# Patient Record
Sex: FEMALE | Race: WHITE | NOT HISPANIC OR LATINO | URBAN - METROPOLITAN AREA
[De-identification: names, ages, dates, MRNs, and addresses within clinical notes are randomized per-mention and may not be internally consistent; named-entity substitution may affect disease eponyms.]

---

## 2017-09-10 ENCOUNTER — EMERGENCY (EMERGENCY)
Facility: HOSPITAL | Age: 52
LOS: 0 days | Discharge: HOME | End: 2017-09-10
Attending: EMERGENCY MEDICINE | Admitting: EMERGENCY MEDICINE
Payer: COMMERCIAL

## 2017-09-10 VITALS — WEIGHT: 169.98 LBS

## 2017-09-10 VITALS
TEMPERATURE: 98 F | DIASTOLIC BLOOD PRESSURE: 72 MMHG | OXYGEN SATURATION: 100 % | RESPIRATION RATE: 20 BRPM | HEART RATE: 85 BPM | SYSTOLIC BLOOD PRESSURE: 124 MMHG

## 2017-09-10 DIAGNOSIS — R51 HEADACHE: ICD-10-CM

## 2017-09-10 DIAGNOSIS — G43.909 MIGRAINE, UNSPECIFIED, NOT INTRACTABLE, WITHOUT STATUS MIGRAINOSUS: ICD-10-CM

## 2017-09-10 LAB
ANION GAP SERPL CALC-SCNC: 5 MMOL/L — SIGNIFICANT CHANGE UP (ref 5–17)
BASOPHILS # BLD AUTO: 0.1 K/UL — SIGNIFICANT CHANGE UP (ref 0–0.2)
BASOPHILS NFR BLD AUTO: 1 % — SIGNIFICANT CHANGE UP (ref 0–2)
BUN SERPL-MCNC: 18 MG/DL — SIGNIFICANT CHANGE UP (ref 7–23)
CALCIUM SERPL-MCNC: 9.1 MG/DL — SIGNIFICANT CHANGE UP (ref 8.5–10.1)
CHLORIDE SERPL-SCNC: 107 MMOL/L — SIGNIFICANT CHANGE UP (ref 96–108)
CO2 SERPL-SCNC: 27 MMOL/L — SIGNIFICANT CHANGE UP (ref 22–31)
CREAT SERPL-MCNC: 0.83 MG/DL — SIGNIFICANT CHANGE UP (ref 0.5–1.3)
EOSINOPHIL # BLD AUTO: 0.1 K/UL — SIGNIFICANT CHANGE UP (ref 0–0.5)
EOSINOPHIL NFR BLD AUTO: 1.1 % — SIGNIFICANT CHANGE UP (ref 0–6)
GLUCOSE SERPL-MCNC: 81 MG/DL — SIGNIFICANT CHANGE UP (ref 70–99)
HCT VFR BLD CALC: 40 % — SIGNIFICANT CHANGE UP (ref 34.5–45)
HGB BLD-MCNC: 13 G/DL — SIGNIFICANT CHANGE UP (ref 11.5–15.5)
LYMPHOCYTES # BLD AUTO: 2 K/UL — SIGNIFICANT CHANGE UP (ref 1–3.3)
LYMPHOCYTES # BLD AUTO: 30.1 % — SIGNIFICANT CHANGE UP (ref 13–44)
MCHC RBC-ENTMCNC: 27.3 PG — SIGNIFICANT CHANGE UP (ref 27–34)
MCHC RBC-ENTMCNC: 32.6 GM/DL — SIGNIFICANT CHANGE UP (ref 32–36)
MCV RBC AUTO: 83.6 FL — SIGNIFICANT CHANGE UP (ref 80–100)
MONOCYTES # BLD AUTO: 0.5 K/UL — SIGNIFICANT CHANGE UP (ref 0–0.9)
MONOCYTES NFR BLD AUTO: 7 % — SIGNIFICANT CHANGE UP (ref 2–14)
NEUTROPHILS # BLD AUTO: 4.1 K/UL — SIGNIFICANT CHANGE UP (ref 1.8–7.4)
NEUTROPHILS NFR BLD AUTO: 60.8 % — SIGNIFICANT CHANGE UP (ref 43–77)
PLATELET # BLD AUTO: 295 K/UL — SIGNIFICANT CHANGE UP (ref 150–400)
POTASSIUM SERPL-MCNC: 3.7 MMOL/L — SIGNIFICANT CHANGE UP (ref 3.5–5.3)
POTASSIUM SERPL-SCNC: 3.7 MMOL/L — SIGNIFICANT CHANGE UP (ref 3.5–5.3)
RBC # BLD: 4.78 M/UL — SIGNIFICANT CHANGE UP (ref 3.8–5.2)
RBC # FLD: 13.3 % — SIGNIFICANT CHANGE UP (ref 10.3–14.5)
SODIUM SERPL-SCNC: 139 MMOL/L — SIGNIFICANT CHANGE UP (ref 135–145)
WBC # BLD: 6.7 K/UL — SIGNIFICANT CHANGE UP (ref 3.8–10.5)
WBC # FLD AUTO: 6.7 K/UL — SIGNIFICANT CHANGE UP (ref 3.8–10.5)

## 2017-09-10 PROCEDURE — 99284 EMERGENCY DEPT VISIT MOD MDM: CPT

## 2017-09-10 RX ORDER — KETOROLAC TROMETHAMINE 30 MG/ML
30 SYRINGE (ML) INJECTION ONCE
Qty: 0 | Refills: 0 | Status: DISCONTINUED | OUTPATIENT
Start: 2017-09-10 | End: 2017-09-10

## 2017-09-10 RX ORDER — METOCLOPRAMIDE HCL 10 MG
10 TABLET ORAL ONCE
Qty: 0 | Refills: 0 | Status: COMPLETED | OUTPATIENT
Start: 2017-09-10 | End: 2017-09-10

## 2017-09-10 RX ORDER — DIPHENHYDRAMINE HCL 50 MG
50 CAPSULE ORAL ONCE
Qty: 0 | Refills: 0 | Status: COMPLETED | OUTPATIENT
Start: 2017-09-10 | End: 2017-09-10

## 2017-09-10 RX ORDER — SODIUM CHLORIDE 9 MG/ML
1000 INJECTION INTRAMUSCULAR; INTRAVENOUS; SUBCUTANEOUS ONCE
Qty: 0 | Refills: 0 | Status: COMPLETED | OUTPATIENT
Start: 2017-09-10 | End: 2017-09-10

## 2017-09-10 RX ADMIN — Medication 30 MILLIGRAM(S): at 10:15

## 2017-09-10 RX ADMIN — SODIUM CHLORIDE 1000 MILLILITER(S): 9 INJECTION INTRAMUSCULAR; INTRAVENOUS; SUBCUTANEOUS at 10:00

## 2017-09-10 RX ADMIN — Medication 30 MILLIGRAM(S): at 10:30

## 2017-09-10 RX ADMIN — Medication 10 MILLIGRAM(S): at 10:15

## 2017-09-10 RX ADMIN — Medication 50 MILLIGRAM(S): at 10:15

## 2017-09-10 NOTE — ED PROVIDER NOTE - CHPI ED SYMPTOMS NEG
no loss of consciousness/no numbness/no dizziness/no fever/no blurred vision/no weakness/no change in level of consciousness/no vomiting/no confusion

## 2017-09-10 NOTE — ED ADULT TRIAGE NOTE - CHIEF COMPLAINT QUOTE
pt c/o migraine for the past week with dizziness, nausea, and light sensitivity. pt states she has hx of migraines and usually takes furoset and topammax, however she has not had a migraine in a while and her medications may be old. pt unable to get appt for neuro.logist.

## 2017-09-10 NOTE — ED ADULT NURSE NOTE - OBJECTIVE STATEMENT
Pt states hx migraines, current migraine 5/10, was on Topamax but can't take anymore due to recent IBS dx, takes Fioricet but believes medication may be getting outdated and not effective anymore, can't get into MD or neuro right away.

## 2017-09-10 NOTE — ED PROVIDER NOTE - OBJECTIVE STATEMENT
51 y/o female with h/o migraine headaches and IBS p/w c/o 5 days of persistent headache but no neck pain or stiffness.  Pt denies f/c/r.

## 2018-03-26 NOTE — ED PROVIDER NOTE - NORMAL, MLM
Ambulates to triage  Chief Complaint   Patient presents with   • Cramping     started last night   • Pregnancy     had a positive pregnancy test last week, pt has an IUD in     Given a specimen cup for urine sample.   
rogerio all pertinent systems normal

## 2019-04-23 PROBLEM — G43.909 MIGRAINE, UNSPECIFIED, NOT INTRACTABLE, WITHOUT STATUS MIGRAINOSUS: Chronic | Status: ACTIVE | Noted: 2017-09-10

## 2019-06-06 ENCOUNTER — APPOINTMENT (OUTPATIENT)
Dept: CARDIOLOGY | Facility: CLINIC | Age: 54
End: 2019-06-06
Payer: COMMERCIAL

## 2019-06-06 ENCOUNTER — NON-APPOINTMENT (OUTPATIENT)
Age: 54
End: 2019-06-06

## 2019-06-06 VITALS
RESPIRATION RATE: 18 BRPM | OXYGEN SATURATION: 99 % | DIASTOLIC BLOOD PRESSURE: 70 MMHG | TEMPERATURE: 98.8 F | HEIGHT: 64 IN | WEIGHT: 160 LBS | BODY MASS INDEX: 27.31 KG/M2 | SYSTOLIC BLOOD PRESSURE: 111 MMHG | HEART RATE: 68 BPM

## 2019-06-06 PROCEDURE — 93000 ELECTROCARDIOGRAM COMPLETE: CPT

## 2019-06-06 PROCEDURE — 99214 OFFICE O/P EST MOD 30 MIN: CPT | Mod: 25

## 2019-10-21 ENCOUNTER — APPOINTMENT (OUTPATIENT)
Dept: CARDIOLOGY | Facility: CLINIC | Age: 54
End: 2019-10-21
Payer: COMMERCIAL

## 2019-10-21 ENCOUNTER — NON-APPOINTMENT (OUTPATIENT)
Age: 54
End: 2019-10-21

## 2019-10-21 VITALS
SYSTOLIC BLOOD PRESSURE: 129 MMHG | BODY MASS INDEX: 26.8 KG/M2 | WEIGHT: 157 LBS | OXYGEN SATURATION: 99 % | DIASTOLIC BLOOD PRESSURE: 75 MMHG | HEIGHT: 64 IN | HEART RATE: 67 BPM

## 2019-10-21 DIAGNOSIS — Z00.00 ENCOUNTER FOR GENERAL ADULT MEDICAL EXAMINATION W/OUT ABNORMAL FINDINGS: ICD-10-CM

## 2019-10-21 PROCEDURE — 99215 OFFICE O/P EST HI 40 MIN: CPT

## 2019-10-21 PROCEDURE — 93000 ELECTROCARDIOGRAM COMPLETE: CPT

## 2019-10-21 NOTE — REASON FOR VISIT
[Follow-Up - Clinic] : a clinic follow-up of [Mitral Regurgitation] : mitral regurgitation [Supraventricular Tachycardia] : supraventricular tachycardia

## 2019-10-28 ENCOUNTER — APPOINTMENT (OUTPATIENT)
Dept: CARDIOLOGY | Facility: CLINIC | Age: 54
End: 2019-10-28

## 2020-01-09 NOTE — DISCUSSION/SUMMARY
[FreeTextEntry1] : Arrhythmia: Asymptomatic \par \par MR: Mild by history will obtain Echo\par \par Migraine/Cluster HA's:  Currently controlled is followed with Neurology \par \par Cardiac testing: Relates stress and Echo performed 2016 \par \par Complete labs with PCP Dr Huynh patient will provide copy of labs no known H/O HLD\par \par Yearly follow up\par \par

## 2020-01-09 NOTE — HISTORY OF PRESENT ILLNESS
[FreeTextEntry1] : 53 Y/O female PMH: Arrhythmia ( APC, PVC,s SVT ), MR, Headache followed with Neurology, Uterine polyps S/P D&C 7/2019 followed with GYN who present here today in routine cardiac F/U former patient of Dr Dawkins here to establish with this practice \par \par Claims  to be feeling well no active cardiac complaints

## 2020-01-09 NOTE — PHYSICAL EXAM
[General Appearance - Well Developed] : well developed [Normal Jugular Venous A Waves Present] : normal jugular venous A waves present [Normal Conjunctiva] : the conjunctiva exhibited no abnormalities [Normal Oral Mucosa] : normal oral mucosa [Respiration, Rhythm And Depth] : normal respiratory rhythm and effort [] : no respiratory distress [Auscultation Breath Sounds / Voice Sounds] : lungs were clear to auscultation bilaterally [Chest Palpation] : palpation of the chest revealed no abnormalities [Exaggerated Use Of Accessory Muscles For Inspiration] : no accessory muscle use [Heart Rate And Rhythm] : heart rate and rhythm were normal [Lungs Percussion] : the lungs were normal to percussion [Heart Sounds] : normal S1 and S2 [Arterial Pulses Normal] : the arterial pulses were normal [Edema] : no peripheral edema present [Murmurs] : no murmurs present [Bowel Sounds] : normal bowel sounds [Veins - Varicosity Changes] : no varicosital changes were noted in the lower extremities [Abdomen Soft] : soft [Nail Clubbing] : no clubbing of the fingernails [Abnormal Walk] : normal gait [Skin Color & Pigmentation] : normal skin color and pigmentation [Oriented To Time, Place, And Person] : oriented to person, place, and time

## 2020-01-09 NOTE — REVIEW OF SYSTEMS
[Blurry Vision] : no blurred vision [Chills] : no chills [Palpitations] : no palpitations [Chest Pain] : no chest pain [Earache] : no earache [Cough] : no cough [Abdominal Pain] : no abdominal pain [Dysuria] : no dysuria [Joint Pain] : no joint pain [Dysphagia] : no dysphagia [Skin: A Rash] : no rash: [Skin Lesions] : no skin lesions [Confusion] : no confusion was observed [Dizziness] : no dizziness [Excessive Thirst] : no polydipsia

## 2020-01-13 ENCOUNTER — APPOINTMENT (OUTPATIENT)
Dept: CARDIOLOGY | Facility: CLINIC | Age: 55
End: 2020-01-13

## 2020-08-10 ENCOUNTER — APPOINTMENT (OUTPATIENT)
Dept: CARDIOLOGY | Facility: CLINIC | Age: 55
End: 2020-08-10
Payer: COMMERCIAL

## 2020-08-10 ENCOUNTER — NON-APPOINTMENT (OUTPATIENT)
Age: 55
End: 2020-08-10

## 2020-08-10 VITALS
HEIGHT: 64 IN | BODY MASS INDEX: 28.68 KG/M2 | WEIGHT: 168 LBS | OXYGEN SATURATION: 96 % | SYSTOLIC BLOOD PRESSURE: 109 MMHG | DIASTOLIC BLOOD PRESSURE: 69 MMHG | HEART RATE: 86 BPM

## 2020-08-10 DIAGNOSIS — G44.029 CHRONIC CLUSTER HEADACHE, NOT INTRACTABLE: ICD-10-CM

## 2020-08-10 PROCEDURE — 93000 ELECTROCARDIOGRAM COMPLETE: CPT

## 2020-08-10 PROCEDURE — 99214 OFFICE O/P EST MOD 30 MIN: CPT

## 2020-08-10 NOTE — DISCUSSION/SUMMARY
[FreeTextEntry1] : \par Patient with hx of COVID infection who is having episodes of shortness of breath and chest tightness , while working ,  possible non cardiac ? non cardiac  would obtain echocardiogram to asses ventricular function , exercise stress test to rule out ischemia \par \par Arrhythmia: Asymptomatic \par \par MR: Mild by history \par \par Migraine/Cluster HA's:  Currently controlled is followed with Neurology \par \par Complete labs with PCP Dr Huynh patient will provide copy of labs no known H/O HLD\par \par \par \par

## 2020-08-10 NOTE — PHYSICAL EXAM
[General Appearance - Well Developed] : well developed [Normal Conjunctiva] : the conjunctiva exhibited no abnormalities [Normal Jugular Venous A Waves Present] : normal jugular venous A waves present [Normal Oral Mucosa] : normal oral mucosa [Exaggerated Use Of Accessory Muscles For Inspiration] : no accessory muscle use [] : no respiratory distress [Auscultation Breath Sounds / Voice Sounds] : lungs were clear to auscultation bilaterally [Respiration, Rhythm And Depth] : normal respiratory rhythm and effort [Lungs Percussion] : the lungs were normal to percussion [Chest Palpation] : palpation of the chest revealed no abnormalities [Heart Rate And Rhythm] : heart rate and rhythm were normal [Murmurs] : no murmurs present [Heart Sounds] : normal S1 and S2 [Arterial Pulses Normal] : the arterial pulses were normal [Edema] : no peripheral edema present [Veins - Varicosity Changes] : no varicosital changes were noted in the lower extremities [Bowel Sounds] : normal bowel sounds [Abdomen Soft] : soft [Abnormal Walk] : normal gait [Nail Clubbing] : no clubbing of the fingernails [Skin Color & Pigmentation] : normal skin color and pigmentation [Oriented To Time, Place, And Person] : oriented to person, place, and time

## 2020-08-10 NOTE — HISTORY OF PRESENT ILLNESS
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

## 2020-08-10 NOTE — REVIEW OF SYSTEMS
[Chills] : no chills [Blurry Vision] : no blurred vision [Earache] : no earache [Eyeglasses] : not currently wearing eyeglasses [Dyspnea on exertion] : not dyspnea during exertion [see HPI] : see HPI [Shortness Of Breath] : shortness of breath [Chest Pain] : chest pain [Palpitations] : no palpitations [Cough] : no cough [Nausea] : no nausea [Abdominal Pain] : no abdominal pain [Dysphagia] : no dysphagia [Dysuria] : no dysuria [Heartburn] : no heartburn [Muscle Cramps] : no muscle cramps [Skin: A Rash] : no rash: [Joint Pain] : no joint pain [Confusion] : no confusion was observed [Dizziness] : no dizziness [Skin Lesions] : no skin lesions [Excessive Thirst] : no polydipsia

## 2020-09-02 ENCOUNTER — LABORATORY RESULT (OUTPATIENT)
Age: 55
End: 2020-09-02

## 2020-09-02 ENCOUNTER — APPOINTMENT (OUTPATIENT)
Dept: CARDIOLOGY | Facility: CLINIC | Age: 55
End: 2020-09-02
Payer: COMMERCIAL

## 2020-09-02 PROCEDURE — 93015 CV STRESS TEST SUPVJ I&R: CPT

## 2020-09-03 LAB
25(OH)D3 SERPL-MCNC: 33.4 NG/ML
ALBUMIN SERPL ELPH-MCNC: 4.5 G/DL
ALP BLD-CCNC: 64 U/L
ALT SERPL-CCNC: 12 U/L
ANION GAP SERPL CALC-SCNC: 14 MMOL/L
APPEARANCE: CLEAR
AST SERPL-CCNC: 17 U/L
BASOPHILS # BLD AUTO: 0.06 K/UL
BASOPHILS NFR BLD AUTO: 0.9 %
BILIRUB SERPL-MCNC: 0.2 MG/DL
BILIRUBIN URINE: NEGATIVE
BLOOD URINE: NEGATIVE
BUN SERPL-MCNC: 18 MG/DL
CALCIUM SERPL-MCNC: 9.3 MG/DL
CHLORIDE SERPL-SCNC: 107 MMOL/L
CHOLEST SERPL-MCNC: 185 MG/DL
CHOLEST/HDLC SERPL: 3.6 RATIO
CO2 SERPL-SCNC: 22 MMOL/L
COLOR: NORMAL
CREAT SERPL-MCNC: 0.81 MG/DL
EOSINOPHIL # BLD AUTO: 0.08 K/UL
EOSINOPHIL NFR BLD AUTO: 1.1 %
GLUCOSE QUALITATIVE U: NEGATIVE
GLUCOSE SERPL-MCNC: 104 MG/DL
HCT VFR BLD CALC: 40.2 %
HDLC SERPL-MCNC: 52 MG/DL
HGB BLD-MCNC: 12.8 G/DL
IMM GRANULOCYTES NFR BLD AUTO: 0.9 %
KETONES URINE: NEGATIVE
LDLC SERPL CALC-MCNC: 110 MG/DL
LEUKOCYTE ESTERASE URINE: ABNORMAL
LYMPHOCYTES # BLD AUTO: 2.13 K/UL
LYMPHOCYTES NFR BLD AUTO: 30.3 %
MAN DIFF?: NORMAL
MCHC RBC-ENTMCNC: 26.8 PG
MCHC RBC-ENTMCNC: 31.8 GM/DL
MCV RBC AUTO: 84.1 FL
MONOCYTES # BLD AUTO: 0.53 K/UL
MONOCYTES NFR BLD AUTO: 7.5 %
NEUTROPHILS # BLD AUTO: 4.18 K/UL
NEUTROPHILS NFR BLD AUTO: 59.3 %
NITRITE URINE: NEGATIVE
PH URINE: 6.5
PLATELET # BLD AUTO: 320 K/UL
POTASSIUM SERPL-SCNC: 4.3 MMOL/L
PROT SERPL-MCNC: 7 G/DL
PROTEIN URINE: NEGATIVE
RBC # BLD: 4.78 M/UL
RBC # FLD: 14.3 %
SODIUM SERPL-SCNC: 143 MMOL/L
SPECIFIC GRAVITY URINE: 1.02
TRIGL SERPL-MCNC: 115 MG/DL
TSH SERPL-ACNC: 0.99 UIU/ML
UROBILINOGEN URINE: NORMAL
WBC # FLD AUTO: 7.04 K/UL

## 2020-09-14 ENCOUNTER — APPOINTMENT (OUTPATIENT)
Dept: CARDIOLOGY | Facility: CLINIC | Age: 55
End: 2020-09-14
Payer: COMMERCIAL

## 2020-09-14 PROCEDURE — 93306 TTE W/DOPPLER COMPLETE: CPT

## 2020-09-21 ENCOUNTER — NON-APPOINTMENT (OUTPATIENT)
Age: 55
End: 2020-09-21

## 2020-09-21 ENCOUNTER — APPOINTMENT (OUTPATIENT)
Dept: CARDIOLOGY | Facility: CLINIC | Age: 55
End: 2020-09-21
Payer: COMMERCIAL

## 2020-09-21 VITALS
WEIGHT: 169 LBS | DIASTOLIC BLOOD PRESSURE: 81 MMHG | SYSTOLIC BLOOD PRESSURE: 117 MMHG | HEART RATE: 69 BPM | OXYGEN SATURATION: 98 % | HEIGHT: 64 IN | BODY MASS INDEX: 28.85 KG/M2

## 2020-09-21 PROCEDURE — 99214 OFFICE O/P EST MOD 30 MIN: CPT

## 2020-09-21 PROCEDURE — 93000 ELECTROCARDIOGRAM COMPLETE: CPT

## 2020-09-21 RX ORDER — BUTALBITAL, ACETAMINOPHEN, CAFFEINE, AND CODEINE PHOSPHATE 50; 300; 40; 30 MG/1; MG/1; MG/1; MG/1
CAPSULE ORAL
Refills: 0 | Status: DISCONTINUED | COMMUNITY
End: 2020-09-21

## 2020-09-21 NOTE — REASON FOR VISIT
[Follow-Up - Clinic] : a clinic follow-up of [Mitral Regurgitation] : mitral regurgitation [Supraventricular Tachycardia] : supraventricular tachycardia [Chest Pain] : chest pain [Medication Management] : Medication management

## 2020-09-21 NOTE — HISTORY OF PRESENT ILLNESS
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  States chest tightness last " seconds " remits as soon as he pulls the mask off.   Denies exertional symptoms reports she exercises 5 days/week W/O limitations  \par \par Stress test No ischemic EKG changes \par Echo: NL LV FX Mild MR\par \par Currently Claims  to be feeling well no active cardiac complaints

## 2020-09-21 NOTE — PHYSICAL EXAM
[General Appearance - Well Developed] : well developed [Normal Appearance] : normal appearance [Well Groomed] : well groomed [General Appearance - Well Nourished] : well nourished [No Deformities] : no deformities [General Appearance - In No Acute Distress] : no acute distress [Normal Conjunctiva] : the conjunctiva exhibited no abnormalities [Heart Rate And Rhythm] : heart rate and rhythm were normal [Heart Sounds] : normal S1 and S2 [Murmurs] : no murmurs present [Abdomen Soft] : soft [Abnormal Walk] : normal gait [Skin Color & Pigmentation] : normal skin color and pigmentation [Oriented To Time, Place, And Person] : oriented to person, place, and time [FreeTextEntry1] : No LE Edema

## 2020-09-21 NOTE — DISCUSSION/SUMMARY
[FreeTextEntry1] : Atypical Chest tightness/SOB: Resolved with removing face mask,  stress test non ischemic EKG Echo NL LV FX suggested to decrease Mobic if tolerated \par \par Arrhythmia: Asymptomatic \par \par MR: Mild by Echo\par \par Migraine/Cluster HA's:  Currently controlled is followed with Neurology \par \par Yearly follow up\par \par

## 2021-03-22 ENCOUNTER — NON-APPOINTMENT (OUTPATIENT)
Age: 56
End: 2021-03-22

## 2021-03-22 ENCOUNTER — APPOINTMENT (OUTPATIENT)
Dept: CARDIOLOGY | Facility: CLINIC | Age: 56
End: 2021-03-22
Payer: COMMERCIAL

## 2021-03-22 VITALS — SYSTOLIC BLOOD PRESSURE: 134 MMHG | DIASTOLIC BLOOD PRESSURE: 80 MMHG

## 2021-03-22 VITALS
HEART RATE: 68 BPM | OXYGEN SATURATION: 99 % | TEMPERATURE: 98.8 F | WEIGHT: 165 LBS | DIASTOLIC BLOOD PRESSURE: 87 MMHG | BODY MASS INDEX: 28.17 KG/M2 | HEIGHT: 64 IN | SYSTOLIC BLOOD PRESSURE: 139 MMHG

## 2021-03-22 DIAGNOSIS — R07.89 OTHER CHEST PAIN: ICD-10-CM

## 2021-03-22 PROCEDURE — 99214 OFFICE O/P EST MOD 30 MIN: CPT

## 2021-03-22 PROCEDURE — 93000 ELECTROCARDIOGRAM COMPLETE: CPT

## 2021-03-22 PROCEDURE — 99072 ADDL SUPL MATRL&STAF TM PHE: CPT

## 2021-03-22 NOTE — PHYSICAL EXAM
[General Appearance - Well Developed] : well developed [Normal Conjunctiva] : the conjunctiva exhibited no abnormalities [Normal Oral Mucosa] : normal oral mucosa [Normal Jugular Venous A Waves Present] : normal jugular venous A waves present [] : no respiratory distress [Respiration, Rhythm And Depth] : normal respiratory rhythm and effort [Exaggerated Use Of Accessory Muscles For Inspiration] : no accessory muscle use [Auscultation Breath Sounds / Voice Sounds] : lungs were clear to auscultation bilaterally [Chest Palpation] : palpation of the chest revealed no abnormalities [Lungs Percussion] : the lungs were normal to percussion [Heart Rate And Rhythm] : heart rate and rhythm were normal [Heart Sounds] : normal S1 and S2 [Murmurs] : no murmurs present [Arterial Pulses Normal] : the arterial pulses were normal [Edema] : no peripheral edema present [Veins - Varicosity Changes] : no varicosital changes were noted in the lower extremities [Bowel Sounds] : normal bowel sounds [Abdomen Soft] : soft [Abnormal Walk] : normal gait [Nail Clubbing] : no clubbing of the fingernails [Skin Color & Pigmentation] : normal skin color and pigmentation [Oriented To Time, Place, And Person] : oriented to person, place, and time

## 2021-03-22 NOTE — DISCUSSION/SUMMARY
[FreeTextEntry1] : \par Patient with hx of COVID infection  with atypical chest tightness , possible due to anxiety with stress  ,patient had normal stress test , normal ventricular function \par \par Arrhythmia:  resolved palpitations Asymptomatic \par \par MR: Mild on echo  monitor periodically \par \par Minimal elevated LDL normal Cholesterol \par \par Migraine/Cluster HA's:  Currently controlled is followed with Neurology \par \par Complete labs with PCP Dr Huynh \par \par \par \par

## 2021-03-22 NOTE — REVIEW OF SYSTEMS
[Chills] : no chills [Blurry Vision] : no blurred vision [Eyeglasses] : not currently wearing eyeglasses [Earache] : no earache [see HPI] : see HPI [Shortness Of Breath] : shortness of breath [Dyspnea on exertion] : not dyspnea during exertion [Chest Pain] : chest pain [Palpitations] : no palpitations [Cough] : no cough [Abdominal Pain] : no abdominal pain [Nausea] : no nausea [Heartburn] : no heartburn [Dysphagia] : no dysphagia [Dysuria] : no dysuria [Joint Pain] : no joint pain [Muscle Cramps] : no muscle cramps [Skin: A Rash] : no rash: [Skin Lesions] : no skin lesions [Dizziness] : no dizziness [Confusion] : no confusion was observed [Excessive Thirst] : no polydipsia

## 2021-03-22 NOTE — REASON FOR VISIT
[Follow-Up - Clinic] : a clinic follow-up of [Chest Pain] : chest pain [Mitral Regurgitation] : mitral regurgitation [Supraventricular Tachycardia] : supraventricular tachycardia

## 2021-03-22 NOTE — HISTORY OF PRESENT ILLNESS
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

## 2021-04-12 ENCOUNTER — RESULT REVIEW (OUTPATIENT)
Age: 56
End: 2021-04-12

## 2021-09-20 ENCOUNTER — NON-APPOINTMENT (OUTPATIENT)
Age: 56
End: 2021-09-20

## 2021-09-20 ENCOUNTER — APPOINTMENT (OUTPATIENT)
Dept: CARDIOLOGY | Facility: CLINIC | Age: 56
End: 2021-09-20
Payer: COMMERCIAL

## 2021-09-20 VITALS
HEART RATE: 71 BPM | OXYGEN SATURATION: 98 % | DIASTOLIC BLOOD PRESSURE: 78 MMHG | WEIGHT: 164 LBS | HEIGHT: 64 IN | BODY MASS INDEX: 28 KG/M2 | SYSTOLIC BLOOD PRESSURE: 124 MMHG

## 2021-09-20 PROCEDURE — 93000 ELECTROCARDIOGRAM COMPLETE: CPT

## 2021-09-20 PROCEDURE — 99214 OFFICE O/P EST MOD 30 MIN: CPT

## 2021-09-20 NOTE — DISCUSSION/SUMMARY
[FreeTextEntry1] : \par Patient with hx of COVID infection  with atypical chest tightness , possible due to anxiety with stress  ,patient had normal stress test , normal ventricular function   resolved \par \par Arrhythmia:  resolved palpitations Asymptomatic \par \par MR: murmur  Mild on echo  monitor periodically \par \par Minimal elevated LDL normal Cholesterol \par \par Migraine/Cluster HA's:  Currently controlled is followed with Neurology \par \par IBS/diverticulosis ,   as per  GI recommendation \par \par Complete labs with PCP Dr Huynh \par \par follow up after 1 year \par \par \par \par

## 2021-09-20 NOTE — HISTORY OF PRESENT ILLNESS
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

## 2021-09-20 NOTE — CARDIOLOGY SUMMARY
[de-identified] :  9/20/21 normal sinus rhythm [de-identified] : 9/1/20 10 METS 86%MPHR Rare PVCS  [de-identified] : 9/14/20 EF 65% Mild MR

## 2021-09-20 NOTE — PHYSICAL EXAM
[Well Developed] : well developed [Well Nourished] : well nourished [No Acute Distress] : no acute distress [Normal Conjunctiva] : normal conjunctiva [Normal Venous Pressure] : normal venous pressure [No Carotid Bruit] : no carotid bruit [Normal Rate] : normal [Normal S1] : normal S1 [Normal S2] : normal S2 [No Pitting Edema] : no pitting edema present [2+] : left 2+ [No Abnormalities] : the abdominal aorta was not enlarged and no bruit was heard [Clear Lung Fields] : clear lung fields [No Respiratory Distress] : no respiratory distress  [Good Air Entry] : good air entry [Non Tender] : non-tender [Soft] : abdomen soft [Normal Bowel Sounds] : normal bowel sounds [Normal Gait] : normal gait [No Edema] : no edema [No Cyanosis] : no cyanosis [No Varicosities] : no varicosities [No Clubbing] : no clubbing [No Rash] : no rash [No Skin Lesions] : no skin lesions [Moves all extremities] : moves all extremities [No Focal Deficits] : no focal deficits [Alert and Oriented] : alert and oriented [Normal Speech] : normal speech [Normal memory] : normal memory [S3] : no S3 [S4] : no S4 [I] : a grade 1 [Right Carotid Bruit] : no bruit heard over the right carotid [Left Carotid Bruit] : no bruit heard over the left carotid [Right Femoral Bruit] : no bruit heard over the right femoral artery [Left Femoral Bruit] : no bruit heard over the left femoral artery

## 2022-02-10 NOTE — ED ADULT NURSE NOTE - ASSOCIATED SYMPTOMS
see HPI Nsaids Counseling: NSAID Counseling: I discussed with the patient that NSAIDs should be taken with food. Prolonged use of NSAIDs can result in the development of stomach ulcers.  Patient advised to stop taking NSAIDs if abdominal pain occurs.  The patient verbalized understanding of the proper use and possible adverse effects of NSAIDs.  All of the patient's questions and concerns were addressed.

## 2022-06-13 NOTE — REASON FOR VISIT
T/c from pts home health nurse and mom on speaker -- called with several issues (scheduled pt for an appt to address issues in cancellation spot for tomorrow)      Nurse and mom requested Dr Kirk Oates be sent a message with the issues in advance so he knew what they were coming in for      - Needs a new nebulizer/tubing/jennings (not facemask) (has been using family members)     - Mom wants bw done -- is urinating often and wants pt to be checked for various possible causes     - Has been seen about r leg swelling in foot and calf -- is still much more swollen than left leg [Follow-Up - Clinic] : a clinic follow-up of [Chest Pain] : chest pain [Mitral Regurgitation] : mitral regurgitation [Supraventricular Tachycardia] : supraventricular tachycardia

## 2022-08-29 ENCOUNTER — NON-APPOINTMENT (OUTPATIENT)
Age: 57
End: 2022-08-29

## 2022-08-29 ENCOUNTER — APPOINTMENT (OUTPATIENT)
Dept: CARDIOLOGY | Facility: CLINIC | Age: 57
End: 2022-08-29

## 2022-08-29 VITALS
BODY MASS INDEX: 29.71 KG/M2 | HEIGHT: 64 IN | SYSTOLIC BLOOD PRESSURE: 100 MMHG | OXYGEN SATURATION: 97 % | TEMPERATURE: 97.8 F | DIASTOLIC BLOOD PRESSURE: 68 MMHG | WEIGHT: 174 LBS | HEART RATE: 71 BPM

## 2022-08-29 PROCEDURE — 99214 OFFICE O/P EST MOD 30 MIN: CPT | Mod: 25

## 2022-08-29 PROCEDURE — 93000 ELECTROCARDIOGRAM COMPLETE: CPT

## 2022-08-29 NOTE — DISCUSSION/SUMMARY
[FreeTextEntry1] : \par Palpitations : intermittent , rare , symptomatic PACS , advised to decrease coffee intake \par \par \par MR: murmur   will obtain echo \par \par Minimal elevated LDL normal Cholesterol  encourage diet restriction , exercise \par \par Migraine/Cluster HA's:  Currently controlled is followed with Neurology \par \par IBS/diverticulosis ,   as per  GI recommendation \par \par Complete labs with PCP Dr Huynh \par \par follow up after 1 year \par \par \par \par

## 2022-08-29 NOTE — PHYSICAL EXAM
[Well Developed] : well developed [Well Nourished] : well nourished [No Acute Distress] : no acute distress [Normal Conjunctiva] : normal conjunctiva [Normal Venous Pressure] : normal venous pressure [No Carotid Bruit] : no carotid bruit [Normal Rate] : normal [Normal S1] : normal S1 [Normal S2] : normal S2 [I] : a grade 1 [No Pitting Edema] : no pitting edema present [2+] : left 2+ [No Abnormalities] : the abdominal aorta was not enlarged and no bruit was heard [Clear Lung Fields] : clear lung fields [Good Air Entry] : good air entry [No Respiratory Distress] : no respiratory distress  [Soft] : abdomen soft [Non Tender] : non-tender [Normal Bowel Sounds] : normal bowel sounds [Normal Gait] : normal gait [No Edema] : no edema [No Cyanosis] : no cyanosis [No Clubbing] : no clubbing [No Varicosities] : no varicosities [No Rash] : no rash [No Skin Lesions] : no skin lesions [Moves all extremities] : moves all extremities [No Focal Deficits] : no focal deficits [Normal Speech] : normal speech [Alert and Oriented] : alert and oriented [Normal memory] : normal memory [S3] : no S3 [S4] : no S4 [Right Carotid Bruit] : no bruit heard over the right carotid [Left Carotid Bruit] : no bruit heard over the left carotid [Right Femoral Bruit] : no bruit heard over the right femoral artery [Left Femoral Bruit] : no bruit heard over the left femoral artery

## 2022-08-29 NOTE — CARDIOLOGY SUMMARY
[de-identified] : 8/29/22  normal sinus rhythm [de-identified] : 9/1/20 10 METS 86%MPHR Rare PVCS  [de-identified] : 9/14/20 EF 65% Mild MR

## 2022-08-29 NOTE — HISTORY OF PRESENT ILLNESS
[FreeTextEntry1] : Patient with hx of Arrhythmia ( APC, PVC,s SVT ), MR, Migraine followed with Neurology, Uterine polyps S/P D&C 7/2019 menopause since october 19 patient came for follow up says she is doing ok , other than having migraine attack    patient did have brief episode of palpitation when she was working in hot weather , does drink coffee , \par \par patient denies any chest pain or shortness of breath \par \par did have diagnosis of diverticulosis , treated diverticulitis .\par \par patient does have chronic headaches ,  her blood work showed elevated LDL   normal TC

## 2022-09-12 ENCOUNTER — APPOINTMENT (OUTPATIENT)
Dept: CARDIOLOGY | Facility: CLINIC | Age: 57
End: 2022-09-12

## 2022-09-12 PROCEDURE — 93306 TTE W/DOPPLER COMPLETE: CPT

## 2022-11-10 ENCOUNTER — APPOINTMENT (OUTPATIENT)
Dept: CT IMAGING | Facility: CLINIC | Age: 57
End: 2022-11-10

## 2022-11-10 ENCOUNTER — OUTPATIENT (OUTPATIENT)
Dept: OUTPATIENT SERVICES | Facility: HOSPITAL | Age: 57
LOS: 1 days | End: 2022-11-10
Payer: COMMERCIAL

## 2022-11-10 ENCOUNTER — INPATIENT (INPATIENT)
Facility: HOSPITAL | Age: 57
LOS: 2 days | Discharge: HOME | DRG: 392 | End: 2022-11-13
Attending: SURGERY | Admitting: SURGERY
Payer: COMMERCIAL

## 2022-11-10 VITALS
TEMPERATURE: 99 F | HEIGHT: 64 IN | DIASTOLIC BLOOD PRESSURE: 72 MMHG | RESPIRATION RATE: 20 BRPM | OXYGEN SATURATION: 97 % | WEIGHT: 167.99 LBS | HEART RATE: 96 BPM | SYSTOLIC BLOOD PRESSURE: 136 MMHG

## 2022-11-10 DIAGNOSIS — K57.80 DIVERTICULITIS OF INTESTINE, PART UNSPECIFIED, WITH PERFORATION AND ABSCESS WITHOUT BLEEDING: ICD-10-CM

## 2022-11-10 DIAGNOSIS — R10.9 UNSPECIFIED ABDOMINAL PAIN: ICD-10-CM

## 2022-11-10 DIAGNOSIS — Z00.8 ENCOUNTER FOR OTHER GENERAL EXAMINATION: ICD-10-CM

## 2022-11-10 LAB
ALBUMIN SERPL ELPH-MCNC: 4.1 G/DL — SIGNIFICANT CHANGE UP (ref 3.3–5)
ALP SERPL-CCNC: 70 U/L — SIGNIFICANT CHANGE UP (ref 40–120)
ALT FLD-CCNC: 11 U/L — SIGNIFICANT CHANGE UP (ref 10–45)
ANION GAP SERPL CALC-SCNC: 11 MMOL/L — SIGNIFICANT CHANGE UP (ref 5–17)
AST SERPL-CCNC: 16 U/L — SIGNIFICANT CHANGE UP (ref 10–40)
BASE EXCESS BLDV CALC-SCNC: -6 MMOL/L — LOW (ref -2–3)
BASOPHILS # BLD AUTO: 0.06 K/UL — SIGNIFICANT CHANGE UP (ref 0–0.2)
BASOPHILS NFR BLD AUTO: 1 % — SIGNIFICANT CHANGE UP (ref 0–2)
BILIRUB SERPL-MCNC: 0.1 MG/DL — LOW (ref 0.2–1.2)
BLD GP AB SCN SERPL QL: NEGATIVE — SIGNIFICANT CHANGE UP
BUN SERPL-MCNC: 15 MG/DL — SIGNIFICANT CHANGE UP (ref 7–23)
CA-I SERPL-SCNC: 0.93 MMOL/L — LOW (ref 1.15–1.33)
CALCIUM SERPL-MCNC: 9.3 MG/DL — SIGNIFICANT CHANGE UP (ref 8.4–10.5)
CHLORIDE BLDV-SCNC: 114 MMOL/L — HIGH (ref 96–108)
CHLORIDE SERPL-SCNC: 105 MMOL/L — SIGNIFICANT CHANGE UP (ref 96–108)
CO2 BLDV-SCNC: 19 MMOL/L — LOW (ref 22–26)
CO2 SERPL-SCNC: 27 MMOL/L — SIGNIFICANT CHANGE UP (ref 22–31)
CREAT SERPL-MCNC: 0.8 MG/DL — SIGNIFICANT CHANGE UP (ref 0.5–1.3)
EGFR: 87 ML/MIN/1.73M2 — SIGNIFICANT CHANGE UP
EOSINOPHIL # BLD AUTO: 0.1 K/UL — SIGNIFICANT CHANGE UP (ref 0–0.5)
EOSINOPHIL NFR BLD AUTO: 1.7 % — SIGNIFICANT CHANGE UP (ref 0–6)
FLUAV AG NPH QL: SIGNIFICANT CHANGE UP
FLUBV AG NPH QL: SIGNIFICANT CHANGE UP
GAS PNL BLDV: 140 MMOL/L — SIGNIFICANT CHANGE UP (ref 136–145)
GAS PNL BLDV: SIGNIFICANT CHANGE UP
GAS PNL BLDV: SIGNIFICANT CHANGE UP
GLUCOSE BLDV-MCNC: 78 MG/DL — SIGNIFICANT CHANGE UP (ref 70–99)
GLUCOSE SERPL-MCNC: 94 MG/DL — SIGNIFICANT CHANGE UP (ref 70–99)
HCO3 BLDV-SCNC: 18 MMOL/L — LOW (ref 22–29)
HCT VFR BLD CALC: 36.4 % — SIGNIFICANT CHANGE UP (ref 34.5–45)
HCT VFR BLDA CALC: 27 % — LOW (ref 34.5–46.5)
HGB BLD CALC-MCNC: 8.9 G/DL — LOW (ref 11.7–16.1)
HGB BLD-MCNC: 11.8 G/DL — SIGNIFICANT CHANGE UP (ref 11.5–15.5)
IMM GRANULOCYTES NFR BLD AUTO: 0.2 % — SIGNIFICANT CHANGE UP (ref 0–0.9)
LACTATE BLDV-MCNC: 0.5 MMOL/L — SIGNIFICANT CHANGE UP (ref 0.5–2)
LYMPHOCYTES # BLD AUTO: 2.08 K/UL — SIGNIFICANT CHANGE UP (ref 1–3.3)
LYMPHOCYTES # BLD AUTO: 35.7 % — SIGNIFICANT CHANGE UP (ref 13–44)
MCHC RBC-ENTMCNC: 27.1 PG — SIGNIFICANT CHANGE UP (ref 27–34)
MCHC RBC-ENTMCNC: 32.4 GM/DL — SIGNIFICANT CHANGE UP (ref 32–36)
MCV RBC AUTO: 83.7 FL — SIGNIFICANT CHANGE UP (ref 80–100)
MONOCYTES # BLD AUTO: 0.65 K/UL — SIGNIFICANT CHANGE UP (ref 0–0.9)
MONOCYTES NFR BLD AUTO: 11.2 % — SIGNIFICANT CHANGE UP (ref 2–14)
NEUTROPHILS # BLD AUTO: 2.92 K/UL — SIGNIFICANT CHANGE UP (ref 1.8–7.4)
NEUTROPHILS NFR BLD AUTO: 50.2 % — SIGNIFICANT CHANGE UP (ref 43–77)
NRBC # BLD: 0 /100 WBCS — SIGNIFICANT CHANGE UP (ref 0–0)
PCO2 BLDV: 28 MMHG — LOW (ref 39–42)
PH BLDV: 7.41 — SIGNIFICANT CHANGE UP (ref 7.32–7.43)
PLATELET # BLD AUTO: 359 K/UL — SIGNIFICANT CHANGE UP (ref 150–400)
PO2 BLDV: 40 MMHG — SIGNIFICANT CHANGE UP (ref 25–45)
POTASSIUM BLDV-SCNC: 3.6 MMOL/L — SIGNIFICANT CHANGE UP (ref 3.5–5.1)
POTASSIUM SERPL-MCNC: 3.7 MMOL/L — SIGNIFICANT CHANGE UP (ref 3.5–5.3)
POTASSIUM SERPL-SCNC: 3.7 MMOL/L — SIGNIFICANT CHANGE UP (ref 3.5–5.3)
PROT SERPL-MCNC: 7.6 G/DL — SIGNIFICANT CHANGE UP (ref 6–8.3)
RBC # BLD: 4.35 M/UL — SIGNIFICANT CHANGE UP (ref 3.8–5.2)
RBC # FLD: 13.3 % — SIGNIFICANT CHANGE UP (ref 10.3–14.5)
RH IG SCN BLD-IMP: POSITIVE — SIGNIFICANT CHANGE UP
RSV RNA NPH QL NAA+NON-PROBE: SIGNIFICANT CHANGE UP
SAO2 % BLDV: 78.9 % — SIGNIFICANT CHANGE UP (ref 67–88)
SARS-COV-2 RNA SPEC QL NAA+PROBE: SIGNIFICANT CHANGE UP
SODIUM SERPL-SCNC: 143 MMOL/L — SIGNIFICANT CHANGE UP (ref 135–145)
WBC # BLD: 5.82 K/UL — SIGNIFICANT CHANGE UP (ref 3.8–10.5)
WBC # FLD AUTO: 5.82 K/UL — SIGNIFICANT CHANGE UP (ref 3.8–10.5)

## 2022-11-10 PROCEDURE — 74177 CT ABD & PELVIS W/CONTRAST: CPT | Mod: 26

## 2022-11-10 PROCEDURE — 99223 1ST HOSP IP/OBS HIGH 75: CPT

## 2022-11-10 PROCEDURE — 99285 EMERGENCY DEPT VISIT HI MDM: CPT

## 2022-11-10 PROCEDURE — 74177 CT ABD & PELVIS W/CONTRAST: CPT

## 2022-11-10 RX ORDER — CIPROFLOXACIN LACTATE 400MG/40ML
400 VIAL (ML) INTRAVENOUS ONCE
Refills: 0 | Status: COMPLETED | OUTPATIENT
Start: 2022-11-10 | End: 2022-11-11

## 2022-11-10 RX ORDER — METRONIDAZOLE 500 MG
TABLET ORAL
Refills: 0 | Status: DISCONTINUED | OUTPATIENT
Start: 2022-11-11 | End: 2022-11-12

## 2022-11-10 RX ORDER — METRONIDAZOLE 500 MG
500 TABLET ORAL EVERY 8 HOURS
Refills: 0 | Status: DISCONTINUED | OUTPATIENT
Start: 2022-11-11 | End: 2022-11-12

## 2022-11-10 RX ORDER — CIPROFLOXACIN LACTATE 400MG/40ML
VIAL (ML) INTRAVENOUS
Refills: 0 | Status: DISCONTINUED | OUTPATIENT
Start: 2022-11-11 | End: 2022-11-12

## 2022-11-10 RX ORDER — PIPERACILLIN AND TAZOBACTAM 4; .5 G/20ML; G/20ML
3.38 INJECTION, POWDER, LYOPHILIZED, FOR SOLUTION INTRAVENOUS ONCE
Refills: 0 | Status: COMPLETED | OUTPATIENT
Start: 2022-11-10 | End: 2022-11-10

## 2022-11-10 RX ORDER — SODIUM CHLORIDE 9 MG/ML
1000 INJECTION, SOLUTION INTRAVENOUS
Refills: 0 | Status: DISCONTINUED | OUTPATIENT
Start: 2022-11-10 | End: 2022-11-12

## 2022-11-10 RX ORDER — CIPROFLOXACIN LACTATE 400MG/40ML
400 VIAL (ML) INTRAVENOUS EVERY 12 HOURS
Refills: 0 | Status: DISCONTINUED | OUTPATIENT
Start: 2022-11-11 | End: 2022-11-12

## 2022-11-10 RX ORDER — ENOXAPARIN SODIUM 100 MG/ML
40 INJECTION SUBCUTANEOUS EVERY 24 HOURS
Refills: 0 | Status: DISCONTINUED | OUTPATIENT
Start: 2022-11-10 | End: 2022-11-13

## 2022-11-10 RX ORDER — METRONIDAZOLE 500 MG
500 TABLET ORAL ONCE
Refills: 0 | Status: COMPLETED | OUTPATIENT
Start: 2022-11-10 | End: 2022-11-11

## 2022-11-10 RX ADMIN — PIPERACILLIN AND TAZOBACTAM 200 GRAM(S): 4; .5 INJECTION, POWDER, LYOPHILIZED, FOR SOLUTION INTRAVENOUS at 18:30

## 2022-11-10 NOTE — H&P ADULT - HISTORY OF PRESENT ILLNESS
HPI:  57F hx of IBS, mitral valve regurgitation identified at birth, migraines, presenting with 2 weeks of abdominal pain. Patient reports mild diffuse pain, initially suspected to be IBS flare, however tested positive for UTI and was treated with course of levaquin for 1 week. Pain continued, acutely worsening last week when walking at [employer] with severe cramping pain in b/l LQ that required sitting down. Pt. went to outpatient GI who ordered CT scan. Found to have microperforated diverticulitis of distal sigmoid colon.     In ED, patient without leukocytosis, afebrile, continues to have bowel function. Endorses bloating after PO intake, however without nausea or vomiting. Denies fevers or chills. Reports last having colonoscopy in early 2021, found to have diverticulitis and 1 polyp.       PAST MEDICAL & SURGICAL HISTORY:  Migraine without status migrainosus, not intractable, unspecified migraine type      No significant past surgical history          MEDICATIONS  (STANDING):  ciprofloxacin   IVPB      ciprofloxacin   IVPB 400 milliGRAM(s) IV Intermittent once  enoxaparin Injectable 40 milliGRAM(s) SubCutaneous every 24 hours  lactated ringers. 1000 milliLiter(s) (100 mL/Hr) IV Continuous <Continuous>  metroNIDAZOLE  IVPB      metroNIDAZOLE  IVPB 500 milliGRAM(s) IV Intermittent once    MEDICATIONS  (PRN):      Allergies    No Known Allergies    Intolerances      Physical Exam:  General: NAD, resting comfortably  HEENT: NC/AT, EOMI, normal hearing, no oral lesions, no LAD, neck supple  Pulmonary: normal resp effort, CTA-B  Cardiovascular: NSR, no murmurs  Abdominal: soft, ND, mildly TTP in LLQ; no organomegaly  Extremities: WWP, normal strength, no clubbing/cyanosis/edema  Neuro: A/O x 3, CNs II-XII grossly intact, normal sensation, no focal deficits  Pulses: palpable distal pulses    Vital Signs Last 24 Hrs  T(C): 36.7 (10 Nov 2022 23:26), Max: 37.2 (10 Nov 2022 14:11)  T(F): 98 (10 Nov 2022 23:26), Max: 99 (10 Nov 2022 14:11)  HR: 58 (10 Nov 2022 23:26) (57 - 96)  BP: 122/72 (10 Nov 2022 23:26) (113/87 - 136/72)  BP(mean): 94 (10 Nov 2022 19:45) (94 - 94)  RR: 16 (10 Nov 2022 23:26) (16 - 20)  SpO2: 97% (10 Nov 2022 23:26) (97% - 99%)    Parameters below as of 10 Nov 2022 23:26  Patient On (Oxygen Delivery Method): room air        I&O's Summary          LABS:                        11.8   5.82  )-----------( 359      ( 10 Nov 2022 18:43 )             36.4     11-10    143  |  105  |  15  ----------------------------<  94  3.7   |  27  |  0.80    Ca    9.3      10 Nov 2022 18:43    TPro  7.6  /  Alb  4.1  /  TBili  0.1<L>  /  DBili  x   /  AST  16  /  ALT  11  /  AlkPhos  70  11-10        CAPILLARY BLOOD GLUCOSE        LIVER FUNCTIONS - ( 10 Nov 2022 18:43 )  Alb: 4.1 g/dL / Pro: 7.6 g/dL / ALK PHOS: 70 U/L / ALT: 11 U/L / AST: 16 U/L / GGT: x             Cultures:      RADIOLOGY & ADDITIONAL STUDIES:    BOWEL: There is diverticulosis of the sigmoid colon. There is concentric wall thickening and prominent enhancement of the distal sigmoid colon adjacent to an inflamed diverticulum on image 90 with an adjacent bubble of extraluminal gas on image 91. No free intraperitoneal gas or drainable fluid collection is identified. There is mild stranding of the adjacent mesentery.     IMPRESSION: Microperforated diverticulitis of the distal sigmoid colon. No evidence of drainable collection.

## 2022-11-10 NOTE — ED PROVIDER NOTE - OBJECTIVE STATEMENT
57F PMH IBS, diverticulosis, migraines, mitral valve regurgitation p/w CT showing perforation of diverticulum. Pt says for the past several weeks she has been having some abdominal discomfort and a pulling sensation in her b/l lower abdomen. She was treated for a UTI and finished Levaquin today. Saw her GI physician Dr. Vu. CT abd/pel today showed a perforation of a sigmoid diverticulum. Pt endorsing some mild abdominal discomfort. Denies fever, N/V, chest pain, SOB, diarrhea, constipation, urinary symptoms. Last bowel movement was yesterday. Has not taken any meds for pain.

## 2022-11-10 NOTE — ED PROVIDER NOTE - PHYSICAL EXAMINATION
PHYSICAL EXAM:  GENERAL: Sitting comfortable in bed, in no acute distress  HENMT: Atraumatic, moist mucous membranes, no oropharyngeal exudates or vesicles, uvula is midline EYES: Clear bilaterally, PERRL, EOMs intact b/l  HEART: RRR, S1/S2, no murmur  RESPIRATORY: Clear to auscultation bilaterally, no wheezes/rhonchi/rales  ABDOMEN: +BS, soft, mild ttp in the LLQ, nondistended  EXTREMITIES: No lower extremity edema, +2 radial pulses b/l  NEURO: A&Ox4  SKIN: Skin normal color for race, warm, dry and intact

## 2022-11-10 NOTE — ED ADULT NURSE NOTE - OBJECTIVE STATEMENT
Assumed care of patient in [facility]. Pt a&ox4 rr even and unlabored with pmhx of migraines, diverticulitis, ibs presents to ed after being called by her doctor with CT of abd reporting "perforation". Pt reports last bm this morning 11/10, denies seeing blood in stool. Pt ambulatory and independent at this time. Pt reports recent feeling of fatigue, denies chest pain sob, fever, chills, gu symptoms. pt educated on plan of care, pt able to successfully teach back plan of care to RN, RN will continue to reeducate pt during hospital stay.

## 2022-11-10 NOTE — ED CLERICAL - NS ED CLERK NOTE PRE-ARRIVAL INFORMATION; ADDITIONAL PRE-ARRIVAL INFORMATION
CC/Reason For referral: Perforated diverticulitis  Preferred Consultant(if applicable):  Who admits for you (if needed): not associated here  Do you have documents you would like to fax over? no  Would you still like to speak to an ED attending? yes please

## 2022-11-10 NOTE — H&P ADULT - ASSESSMENT
57F hx of IBS, mitral valve regurgitation identified at birth, migraines, presenting with 2 weeks of abdominal pain, found to have microperforated diverticulitis of distal sigmoid colon.     PLAN:  - Admit to [facility], under Dr. Florencio Lopez  - NPO/IVF  - IV abx, cipro/flagyl  - Exam before pain meds      D/w fellow on behalf of attending    KELSEY Mcallister, PGY-2  [facility]  [id number]

## 2022-11-10 NOTE — ED PROVIDER NOTE - NSICDXPASTMEDICALHX_GEN_ALL_CORE_FT
PAST MEDICAL HISTORY:  Migraine without status migrainosus, not intractable, unspecified migraine type

## 2022-11-10 NOTE — ED PROVIDER NOTE - ATTENDING CONTRIBUTION TO CARE
attending Guillermina: 57yF h/o IBS, diverticulosis, migraines, mitral valve regurgitation presents after outpatient CT showing perforated diverticulitis. Pt with several weeks LLQ discomfort. Completed course of levaquin for UTI today. Exam as above. Will review outpatient CT, preop labs, discuss with surgery

## 2022-11-10 NOTE — ED PROVIDER NOTE - CLINICAL SUMMARY MEDICAL DECISION MAKING FREE TEXT BOX
57F PMH IBS, diverticulosis, migraines, mitral valve regurgitation p/w CT showing perforation of diverticulum. Vitals: unremarkable. On exam, abdomen with +BS, soft, mild ttp in the LLQ, nondistended. Will consult surgery and get blood work, type and screen, ECG, and give zosyn. Will re-assess.

## 2022-11-11 LAB
ANION GAP SERPL CALC-SCNC: 10 MMOL/L — SIGNIFICANT CHANGE UP (ref 5–17)
APPEARANCE UR: CLEAR — SIGNIFICANT CHANGE UP
BILIRUB UR-MCNC: NEGATIVE — SIGNIFICANT CHANGE UP
BUN SERPL-MCNC: 13 MG/DL — SIGNIFICANT CHANGE UP (ref 7–23)
CALCIUM SERPL-MCNC: 9.3 MG/DL — SIGNIFICANT CHANGE UP (ref 8.4–10.5)
CHLORIDE SERPL-SCNC: 105 MMOL/L — SIGNIFICANT CHANGE UP (ref 96–108)
CO2 SERPL-SCNC: 27 MMOL/L — SIGNIFICANT CHANGE UP (ref 22–31)
COLOR SPEC: SIGNIFICANT CHANGE UP
CREAT SERPL-MCNC: 0.77 MG/DL — SIGNIFICANT CHANGE UP (ref 0.5–1.3)
DIFF PNL FLD: NEGATIVE — SIGNIFICANT CHANGE UP
EGFR: 91 ML/MIN/1.73M2 — SIGNIFICANT CHANGE UP
GLUCOSE SERPL-MCNC: 94 MG/DL — SIGNIFICANT CHANGE UP (ref 70–99)
GLUCOSE UR QL: NEGATIVE — SIGNIFICANT CHANGE UP
HCT VFR BLD CALC: 37.4 % — SIGNIFICANT CHANGE UP (ref 34.5–45)
HGB BLD-MCNC: 12.2 G/DL — SIGNIFICANT CHANGE UP (ref 11.5–15.5)
KETONES UR-MCNC: NEGATIVE — SIGNIFICANT CHANGE UP
LEUKOCYTE ESTERASE UR-ACNC: NEGATIVE — SIGNIFICANT CHANGE UP
MAGNESIUM SERPL-MCNC: 2.2 MG/DL — SIGNIFICANT CHANGE UP (ref 1.6–2.6)
MCHC RBC-ENTMCNC: 27.2 PG — SIGNIFICANT CHANGE UP (ref 27–34)
MCHC RBC-ENTMCNC: 32.6 GM/DL — SIGNIFICANT CHANGE UP (ref 32–36)
MCV RBC AUTO: 83.3 FL — SIGNIFICANT CHANGE UP (ref 80–100)
NITRITE UR-MCNC: NEGATIVE — SIGNIFICANT CHANGE UP
NRBC # BLD: 0 /100 WBCS — SIGNIFICANT CHANGE UP (ref 0–0)
PH UR: 6 — SIGNIFICANT CHANGE UP (ref 5–8)
PHOSPHATE SERPL-MCNC: 4 MG/DL — SIGNIFICANT CHANGE UP (ref 2.5–4.5)
PLATELET # BLD AUTO: 334 K/UL — SIGNIFICANT CHANGE UP (ref 150–400)
POTASSIUM SERPL-MCNC: 3.7 MMOL/L — SIGNIFICANT CHANGE UP (ref 3.5–5.3)
POTASSIUM SERPL-SCNC: 3.7 MMOL/L — SIGNIFICANT CHANGE UP (ref 3.5–5.3)
PROT UR-MCNC: NEGATIVE — SIGNIFICANT CHANGE UP
RBC # BLD: 4.49 M/UL — SIGNIFICANT CHANGE UP (ref 3.8–5.2)
RBC # FLD: 13.3 % — SIGNIFICANT CHANGE UP (ref 10.3–14.5)
SODIUM SERPL-SCNC: 142 MMOL/L — SIGNIFICANT CHANGE UP (ref 135–145)
SP GR SPEC: 1.03 — HIGH (ref 1.01–1.02)
UROBILINOGEN FLD QL: NEGATIVE — SIGNIFICANT CHANGE UP
WBC # BLD: 5.87 K/UL — SIGNIFICANT CHANGE UP (ref 3.8–10.5)
WBC # FLD AUTO: 5.87 K/UL — SIGNIFICANT CHANGE UP (ref 3.8–10.5)

## 2022-11-11 RX ORDER — POTASSIUM CHLORIDE 20 MEQ
40 PACKET (EA) ORAL ONCE
Refills: 0 | Status: COMPLETED | OUTPATIENT
Start: 2022-11-11 | End: 2022-11-11

## 2022-11-11 RX ADMIN — Medication 100 MILLIGRAM(S): at 18:44

## 2022-11-11 RX ADMIN — Medication 40 MILLIEQUIVALENT(S): at 13:55

## 2022-11-11 RX ADMIN — SODIUM CHLORIDE 100 MILLILITER(S): 9 INJECTION, SOLUTION INTRAVENOUS at 03:51

## 2022-11-11 RX ADMIN — Medication 100 MILLIGRAM(S): at 03:51

## 2022-11-11 RX ADMIN — SODIUM CHLORIDE 100 MILLILITER(S): 9 INJECTION, SOLUTION INTRAVENOUS at 22:30

## 2022-11-11 RX ADMIN — ENOXAPARIN SODIUM 40 MILLIGRAM(S): 100 INJECTION SUBCUTANEOUS at 12:35

## 2022-11-11 RX ADMIN — SODIUM CHLORIDE 100 MILLILITER(S): 9 INJECTION, SOLUTION INTRAVENOUS at 13:55

## 2022-11-11 RX ADMIN — Medication 200 MILLIGRAM(S): at 17:44

## 2022-11-11 RX ADMIN — Medication 200 MILLIGRAM(S): at 05:34

## 2022-11-11 RX ADMIN — Medication 1 DROP(S): at 22:23

## 2022-11-11 RX ADMIN — Medication 100 MILLIGRAM(S): at 11:05

## 2022-11-11 NOTE — PATIENT PROFILE ADULT - FALL HARM RISK - UNIVERSAL INTERVENTIONS
Bed in lowest position, wheels locked, appropriate side rails in place/Call bell, personal items and telephone in reach/Instruct patient to call for assistance before getting out of bed or chair/Non-slip footwear when patient is out of bed/[place] to call system/Physically safe environment - no spills, clutter or unnecessary equipment/Purposeful Proactive Rounding/Room/bathroom lighting operational, light cord in reach

## 2022-11-12 LAB
ANION GAP SERPL CALC-SCNC: 10 MMOL/L — SIGNIFICANT CHANGE UP (ref 5–17)
BUN SERPL-MCNC: 8 MG/DL — SIGNIFICANT CHANGE UP (ref 7–23)
CALCIUM SERPL-MCNC: 9 MG/DL — SIGNIFICANT CHANGE UP (ref 8.4–10.5)
CHLORIDE SERPL-SCNC: 107 MMOL/L — SIGNIFICANT CHANGE UP (ref 96–108)
CO2 SERPL-SCNC: 24 MMOL/L — SIGNIFICANT CHANGE UP (ref 22–31)
CREAT SERPL-MCNC: 0.7 MG/DL — SIGNIFICANT CHANGE UP (ref 0.5–1.3)
EGFR: 102 ML/MIN/1.73M2 — SIGNIFICANT CHANGE UP
GLUCOSE SERPL-MCNC: 101 MG/DL — HIGH (ref 70–99)
HCT VFR BLD CALC: 36.6 % — SIGNIFICANT CHANGE UP (ref 34.5–45)
HGB BLD-MCNC: 11.8 G/DL — SIGNIFICANT CHANGE UP (ref 11.5–15.5)
MAGNESIUM SERPL-MCNC: 2.1 MG/DL — SIGNIFICANT CHANGE UP (ref 1.6–2.6)
MCHC RBC-ENTMCNC: 27.5 PG — SIGNIFICANT CHANGE UP (ref 27–34)
MCHC RBC-ENTMCNC: 32.2 GM/DL — SIGNIFICANT CHANGE UP (ref 32–36)
MCV RBC AUTO: 85.3 FL — SIGNIFICANT CHANGE UP (ref 80–100)
NRBC # BLD: 0 /100 WBCS — SIGNIFICANT CHANGE UP (ref 0–0)
PHOSPHATE SERPL-MCNC: 3.1 MG/DL — SIGNIFICANT CHANGE UP (ref 2.5–4.5)
PLATELET # BLD AUTO: 330 K/UL — SIGNIFICANT CHANGE UP (ref 150–400)
POTASSIUM SERPL-MCNC: 4 MMOL/L — SIGNIFICANT CHANGE UP (ref 3.5–5.3)
POTASSIUM SERPL-SCNC: 4 MMOL/L — SIGNIFICANT CHANGE UP (ref 3.5–5.3)
RBC # BLD: 4.29 M/UL — SIGNIFICANT CHANGE UP (ref 3.8–5.2)
RBC # FLD: 13.4 % — SIGNIFICANT CHANGE UP (ref 10.3–14.5)
SODIUM SERPL-SCNC: 141 MMOL/L — SIGNIFICANT CHANGE UP (ref 135–145)
WBC # BLD: 4.7 K/UL — SIGNIFICANT CHANGE UP (ref 3.8–10.5)
WBC # FLD AUTO: 4.7 K/UL — SIGNIFICANT CHANGE UP (ref 3.8–10.5)

## 2022-11-12 PROCEDURE — 99231 SBSQ HOSP IP/OBS SF/LOW 25: CPT

## 2022-11-12 RX ORDER — METRONIDAZOLE 500 MG
500 TABLET ORAL EVERY 8 HOURS
Refills: 0 | Status: DISCONTINUED | OUTPATIENT
Start: 2022-11-12 | End: 2022-11-13

## 2022-11-12 RX ORDER — INFLUENZA VIRUS VACCINE 15; 15; 15; 15 UG/.5ML; UG/.5ML; UG/.5ML; UG/.5ML
0.5 SUSPENSION INTRAMUSCULAR ONCE
Refills: 0 | Status: DISCONTINUED | OUTPATIENT
Start: 2022-11-12 | End: 2022-11-13

## 2022-11-12 RX ORDER — CIPROFLOXACIN LACTATE 400MG/40ML
500 VIAL (ML) INTRAVENOUS EVERY 12 HOURS
Refills: 0 | Status: DISCONTINUED | OUTPATIENT
Start: 2022-11-12 | End: 2022-11-13

## 2022-11-12 RX ADMIN — ENOXAPARIN SODIUM 40 MILLIGRAM(S): 100 INJECTION SUBCUTANEOUS at 12:50

## 2022-11-12 RX ADMIN — Medication 100 MILLIGRAM(S): at 02:07

## 2022-11-12 RX ADMIN — Medication 1 TABLET(S): at 11:19

## 2022-11-12 RX ADMIN — Medication 200 MILLIGRAM(S): at 05:47

## 2022-11-12 RX ADMIN — Medication 1 DROP(S): at 11:20

## 2022-11-13 ENCOUNTER — TRANSCRIPTION ENCOUNTER (OUTPATIENT)
Age: 57
End: 2022-11-13

## 2022-11-13 VITALS
TEMPERATURE: 98 F | OXYGEN SATURATION: 95 % | SYSTOLIC BLOOD PRESSURE: 127 MMHG | HEART RATE: 70 BPM | RESPIRATION RATE: 18 BRPM | DIASTOLIC BLOOD PRESSURE: 75 MMHG

## 2022-11-13 LAB
HCT VFR BLD CALC: 38.3 % — SIGNIFICANT CHANGE UP (ref 34.5–45)
HGB BLD-MCNC: 12.5 G/DL — SIGNIFICANT CHANGE UP (ref 11.5–15.5)
MCHC RBC-ENTMCNC: 27.4 PG — SIGNIFICANT CHANGE UP (ref 27–34)
MCHC RBC-ENTMCNC: 32.6 GM/DL — SIGNIFICANT CHANGE UP (ref 32–36)
MCV RBC AUTO: 83.8 FL — SIGNIFICANT CHANGE UP (ref 80–100)
NRBC # BLD: 0 /100 WBCS — SIGNIFICANT CHANGE UP (ref 0–0)
PLATELET # BLD AUTO: 337 K/UL — SIGNIFICANT CHANGE UP (ref 150–400)
RBC # BLD: 4.57 M/UL — SIGNIFICANT CHANGE UP (ref 3.8–5.2)
RBC # FLD: 13.5 % — SIGNIFICANT CHANGE UP (ref 10.3–14.5)
WBC # BLD: 5.83 K/UL — SIGNIFICANT CHANGE UP (ref 3.8–10.5)
WBC # FLD AUTO: 5.83 K/UL — SIGNIFICANT CHANGE UP (ref 3.8–10.5)

## 2022-11-13 PROCEDURE — 82435 ASSAY OF BLOOD CHLORIDE: CPT

## 2022-11-13 PROCEDURE — 82947 ASSAY GLUCOSE BLOOD QUANT: CPT

## 2022-11-13 PROCEDURE — 85014 HEMATOCRIT: CPT

## 2022-11-13 PROCEDURE — 82565 ASSAY OF CREATININE: CPT

## 2022-11-13 PROCEDURE — 99285 EMERGENCY DEPT VISIT HI MDM: CPT | Mod: 25

## 2022-11-13 PROCEDURE — 80048 BASIC METABOLIC PNL TOTAL CA: CPT

## 2022-11-13 PROCEDURE — 80053 COMPREHEN METABOLIC PANEL: CPT

## 2022-11-13 PROCEDURE — 87637 SARSCOV2&INF A&B&RSV AMP PRB: CPT

## 2022-11-13 PROCEDURE — 96374 THER/PROPH/DIAG INJ IV PUSH: CPT

## 2022-11-13 PROCEDURE — 85025 COMPLETE CBC W/AUTO DIFF WBC: CPT

## 2022-11-13 PROCEDURE — 82330 ASSAY OF CALCIUM: CPT

## 2022-11-13 PROCEDURE — 85018 HEMOGLOBIN: CPT

## 2022-11-13 PROCEDURE — 84295 ASSAY OF SERUM SODIUM: CPT

## 2022-11-13 PROCEDURE — 99238 HOSP IP/OBS DSCHRG MGMT 30/<: CPT | Mod: GC

## 2022-11-13 PROCEDURE — 36415 COLL VENOUS BLD VENIPUNCTURE: CPT

## 2022-11-13 PROCEDURE — 86900 BLOOD TYPING SEROLOGIC ABO: CPT

## 2022-11-13 PROCEDURE — 86850 RBC ANTIBODY SCREEN: CPT

## 2022-11-13 PROCEDURE — 83735 ASSAY OF MAGNESIUM: CPT

## 2022-11-13 PROCEDURE — 85027 COMPLETE CBC AUTOMATED: CPT

## 2022-11-13 PROCEDURE — 82803 BLOOD GASES ANY COMBINATION: CPT

## 2022-11-13 PROCEDURE — 83605 ASSAY OF LACTIC ACID: CPT

## 2022-11-13 PROCEDURE — 86901 BLOOD TYPING SEROLOGIC RH(D): CPT

## 2022-11-13 PROCEDURE — 93005 ELECTROCARDIOGRAM TRACING: CPT

## 2022-11-13 PROCEDURE — 84132 ASSAY OF SERUM POTASSIUM: CPT

## 2022-11-13 PROCEDURE — 84100 ASSAY OF PHOSPHORUS: CPT

## 2022-11-13 PROCEDURE — 81003 URINALYSIS AUTO W/O SCOPE: CPT

## 2022-11-13 RX ORDER — CIPROFLOXACIN LACTATE 400MG/40ML
1 VIAL (ML) INTRAVENOUS
Qty: 0 | Refills: 0 | DISCHARGE
Start: 2022-11-13

## 2022-11-13 RX ORDER — CIPROFLOXACIN LACTATE 400MG/40ML
1 VIAL (ML) INTRAVENOUS
Qty: 20 | Refills: 0
Start: 2022-11-13 | End: 2022-11-22

## 2022-11-13 RX ORDER — METRONIDAZOLE 500 MG
1 TABLET ORAL
Qty: 30 | Refills: 0
Start: 2022-11-13 | End: 2022-11-22

## 2022-11-13 RX ORDER — METRONIDAZOLE 500 MG
1 TABLET ORAL
Qty: 0 | Refills: 0 | DISCHARGE
Start: 2022-11-13

## 2022-11-13 RX ADMIN — Medication 500 MILLIGRAM(S): at 14:47

## 2022-11-13 RX ADMIN — Medication 500 MILLIGRAM(S): at 05:23

## 2022-11-13 RX ADMIN — Medication 500 MILLIGRAM(S): at 05:22

## 2022-11-13 RX ADMIN — Medication 1 DROP(S): at 12:45

## 2022-11-13 RX ADMIN — Medication 500 MILLIGRAM(S): at 17:04

## 2022-11-13 NOTE — DISCHARGE NOTE PROVIDER - NSDCCPCAREPLAN_GEN_ALL_CORE_FT
PRINCIPAL DISCHARGE DIAGNOSIS  Diagnosis: Perforated diverticulum  Assessment and Plan of Treatment: Continue to take your antibiotics as prescibed  BATHING: You may shower and/or sponge bathe. You may use warm soapy water in the shower and rinse, pat dry.  ACTIVITY: No heavy lifting or straining in 2 weeks. Otherwise, you may return to your usual level of physical activity.  DIET: Return to your low fiber diet.  NOTIFY YOUR SURGEON IF YOU HAVE:  any fever (over 100.4 F) persistent nausea/vomiting, or if your pain is not controlled, unable to urinate.  FOLLOW UP:  1. Please follow up with your primary care physician in one week regarding your hospitalization, bring copies of your discharge paperwork.  2. Please follow up with your surgeon, Dr. Lopez in 2 weeks

## 2022-11-13 NOTE — PROGRESS NOTE ADULT - ASSESSMENT
57F with acute diverticulitis with microperforation    cont  oral abx today and full 10 day course on discharge of cipro/flagyl  cont regular diet   Dc home today 
57F with acute diverticulitis with microperforation    has been on oral abx for one week  overall looks well, minimal pain  advance to clear liquids  IV abx  serial exams
57F with acute diverticulitis with microperforation    switch to oral abx today  advance diet  possible DC home in evening

## 2022-11-13 NOTE — DISCHARGE NOTE PROVIDER - NSDCMRMEDTOKEN_GEN_ALL_CORE_FT
ciprofloxacin 500 mg oral tablet: 1 tab(s) orally every 12 hours  ciprofloxacin 500 mg oral tablet: 1 tab(s) orally 2 times a day   metroNIDAZOLE 500 mg oral tablet: 1 tab(s) orally every 8 hours   metroNIDAZOLE 500 mg oral tablet: 1 tab(s) orally every 8 hours

## 2022-11-13 NOTE — DISCHARGE NOTE NURSING/CASE MANAGEMENT/SOCIAL WORK - NSDCPEFALRISK_GEN_ALL_CORE
For information on Fall & Injury Prevention, visit: [email or web address] OR  [email or web address] OR  https://www.cdc.gov/steadi/patient.html

## 2022-11-13 NOTE — DISCHARGE NOTE PROVIDER - HOSPITAL COURSE
57F hx of IBS, mitral valve regurgitation identified at birth, migraines, presenting with 2 weeks of abdominal pain. Patient reports mild diffuse pain, initially suspected to be IBS flare, however tested positive for UTI and was treated with course of levaquin for 1 week. Pain continued, acutely worsening last week when walking at [employer] with severe cramping pain in b/l LQ that required sitting down. Pt. went to outpatient GI who ordered CT scan. Found to have microperforated diverticulitis of distal sigmoid colon.     In ED, patient without leukocytosis, afebrile, continues to have bowel function. Endorses bloating after PO intake, however without nausea or vomiting. Denies fevers or chills. Reports last having colonoscopy in early 2021, found to have diverticulitis and 1 polyp.     She was made NPO and started on IV antibiotics. her abdominal pain and bloating improved. Her diet was advanced as tolerated and she was switched to PO antibiotics. She remained afebrile without leukocytosis. On day of discharge she was ambulating, tolerating diet, and with out abdominal pain.

## 2022-11-13 NOTE — DISCHARGE NOTE NURSING/CASE MANAGEMENT/SOCIAL WORK - PATIENT PORTAL LINK FT
You can access the FollowMyHealth Patient Portal offered by [facility] by registering at the following website: [email or web address]. By joining [facility]’s FollowMyHealth portal, you will also be able to view your health information using other applications (apps) compatible with our system.

## 2022-11-13 NOTE — PROGRESS NOTE ADULT - SUBJECTIVE AND OBJECTIVE BOX
GENERAL SURGERY PROGRESS NOTE    minimal pain  no nausea or vomiting  hungry  afebrile     10-point review of systems completed and negative except as noted above.      OBJECTIVE    MEDICATIONS  ciprofloxacin   IVPB      ciprofloxacin   IVPB 400 milliGRAM(s) IV Intermittent every 12 hours  enoxaparin Injectable 40 milliGRAM(s) SubCutaneous every 24 hours  lactated ringers. 1000 milliLiter(s) IV Continuous <Continuous>  metroNIDAZOLE  IVPB      metroNIDAZOLE  IVPB 500 milliGRAM(s) IV Intermittent every 8 hours      PHYSICAL EXAM  T(C): 36.8 (22 @ 08:32), Max: 37.2 (11-10-22 @ 14:11)  HR: 52 (22 @ 08:32) (52 - 96)  BP: 119/68 (22 @ 08:32) (99/57 - 136/72)  RR: 16 (22 @ 08:32) (16 - 20)  SpO2: 98% (22 @ 08:32) (94% - 99%)    11-10-22 @ 07:01  -  22 @ 07:00  --------------------------------------------------------  IN: 940 mL / OUT: 0 mL / NET: 940 mL    22 @ 07:01  -  22 @ 10:03  --------------------------------------------------------  IN: 0 mL / OUT: 200 mL / NET: -200 mL        General: Appears well, NAD  Neuro: AAOx3  CHEST: Clear to auscultation bilaterally  CV: Regular rate and rhythm  Abdomen: soft, nontender, nondistended, no rebound or guarding  Extremities: Grossly symmetric    LABS                        12.2   5.87  )-----------( 334      ( 2022 07:29 )             37.4         142  |  105  |  13  ----------------------------<  94  3.7   |  27  |  0.77    Ca    9.3      2022 07:29  Phos  4.0     11-  Mg     2.2     -    TPro  7.6  /  Alb  4.1  /  TBili  0.1<L>  /  DBili  x   /  AST  16  /  ALT  11  /  AlkPhos  70  11-10      Urinalysis Basic - ( 2022 00:16 )    Color: Light Yellow / Appearance: Clear / S.030 / pH: x  Gluc: x / Ketone: Negative  / Bili: Negative / Urobili: Negative   Blood: x / Protein: Negative / Nitrite: Negative   Leuk Esterase: Negative / RBC: x / WBC x   Sq Epi: x / Non Sq Epi: x / Bacteria: x        RADIOLOGY & ADDITIONAL STUDIES
GENERAL SURGERY PROGRESS NOTE    tolerated clear liquids  no pain  remains afebrile     10-point review of systems completed and negative except as noted above.      OBJECTIVE    MEDICATIONS  amoxicillin  875 milliGRAM(s)/clavulanate 1 Tablet(s) Oral every 12 hours  artificial  tears Solution 1 Drop(s) Both EYES daily  enoxaparin Injectable 40 milliGRAM(s) SubCutaneous every 24 hours  influenza   Vaccine 0.5 milliLiter(s) IntraMuscular once      PHYSICAL EXAM  T(C): 36.8 (22 @ 08:30), Max: 36.8 (22 @ 12:38)  HR: 60 (22 @ 08:30) (52 - 78)  BP: 116/76 (22 @ 08:30) (100/60 - 123/78)  RR: 18 (22 @ 08:30) (16 - 18)  SpO2: 98% (22 @ 08:30) (93% - 98%)    22 @ 07:01  -  22 @ 07:00  --------------------------------------------------------  IN: 2840 mL / OUT: 1200 mL / NET: 1640 mL        General: Appears well, NAD  Neuro: AAOx3  CHEST: Clear to auscultation bilaterally  CV: Regular rate and rhythm  Abdomen: soft, nontender, nondistended, no rebound or guarding  Extremities: Grossly symmetric    LABS                        11.8   4.70  )-----------( 330      ( 2022 07:16 )             36.6     -12    141  |  107  |  8   ----------------------------<  101<H>  4.0   |  24  |  0.70    Ca    9.0      2022 07:14  Phos  3.1     11-12  Mg     2.1     12    TPro  7.6  /  Alb  4.1  /  TBili  0.1<L>  /  DBili  x   /  AST  16  /  ALT  11  /  AlkPhos  70  11-10      Urinalysis Basic - ( 2022 00:16 )    Color: Light Yellow / Appearance: Clear / S.030 / pH: x  Gluc: x / Ketone: Negative  / Bili: Negative / Urobili: Negative   Blood: x / Protein: Negative / Nitrite: Negative   Leuk Esterase: Negative / RBC: x / WBC x   Sq Epi: x / Non Sq Epi: x / Bacteria: x        RADIOLOGY & ADDITIONAL STUDIES
GENERAL SURGERY PROGRESS NOTE    some bloating after lunch yesterday  improved overnight  feels well this morning  afebrile     10-point review of systems completed and negative except as noted above.      OBJECTIVE    MEDICATIONS  artificial  tears Solution 1 Drop(s) Both EYES daily  ciprofloxacin     Tablet 500 milliGRAM(s) Oral every 12 hours  enoxaparin Injectable 40 milliGRAM(s) SubCutaneous every 24 hours  influenza   Vaccine 0.5 milliLiter(s) IntraMuscular once  metroNIDAZOLE    Tablet 500 milliGRAM(s) Oral every 8 hours      PHYSICAL EXAM  T(C): 37.1 (11-13-22 @ 08:55), Max: 37.1 (11-12-22 @ 12:30)  HR: 71 (11-13-22 @ 08:55) (53 - 71)  BP: 106/71 (11-13-22 @ 08:55) (101/67 - 118/69)  RR: 18 (11-13-22 @ 08:55) (18 - 18)  SpO2: 96% (11-13-22 @ 08:55) (95% - 98%)    11-12-22 @ 07:01  -  11-13-22 @ 07:00  --------------------------------------------------------  IN: 640 mL / OUT: 450 mL / NET: 190 mL    11-13-22 @ 07:01  -  11-13-22 @ 12:00  --------------------------------------------------------  IN: 120 mL / OUT: 0 mL / NET: 120 mL        General: Appears well, NAD  Neuro: AAOx3  CHEST: Clear to auscultation bilaterally  CV: Regular rate and rhythm  Abdomen: soft, nontender, nondistended, no rebound or guarding  Extremities: Grossly symmetric    LABS                        12.5   5.83  )-----------( 337      ( 13 Nov 2022 07:03 )             38.3     11-12    141  |  107  |  8   ----------------------------<  101<H>  4.0   |  24  |  0.70    Ca    9.0      12 Nov 2022 07:14  Phos  3.1     11-12  Mg     2.1     11-12            RADIOLOGY & ADDITIONAL STUDIES

## 2022-11-13 NOTE — DISCHARGE NOTE PROVIDER - CARE PROVIDER_API CALL
Florencio Lopez)  ColonRectal Surgery; Surgery  [address], [address]  [place] [address]  Phone: [phone]  Fax: [phone]  Follow Up Time: 2 weeks

## 2022-12-16 ENCOUNTER — APPOINTMENT (OUTPATIENT)
Dept: COLORECTAL SURGERY | Facility: CLINIC | Age: 57
End: 2022-12-16

## 2022-12-16 VITALS
BODY MASS INDEX: 27.49 KG/M2 | HEART RATE: 60 BPM | RESPIRATION RATE: 12 BRPM | SYSTOLIC BLOOD PRESSURE: 130 MMHG | WEIGHT: 161 LBS | DIASTOLIC BLOOD PRESSURE: 80 MMHG | HEIGHT: 64 IN | TEMPERATURE: 36.9 F

## 2022-12-16 DIAGNOSIS — U07.1 COVID-19: ICD-10-CM

## 2022-12-16 DIAGNOSIS — K57.32 DIVERTICULITIS OF LARGE INTESTINE W/OUT PERFORATION OR ABSCESS W/OUT BLEEDING: ICD-10-CM

## 2022-12-16 PROCEDURE — 99213 OFFICE O/P EST LOW 20 MIN: CPT

## 2022-12-16 RX ORDER — MULTIVITAMIN
TABLET ORAL
Refills: 0 | Status: ACTIVE | COMMUNITY

## 2022-12-20 NOTE — HISTORY OF PRESENT ILLNESS
[FreeTextEntry1] : 58yo WF with initial screening colonoscopy in 2022. Reportedly +diverticulosis/diverticulitis. Pt treated with Cipro/Flagyl [id number]. CT scan performed.\par 2022 seen at Urgent Care with suprapubic pain. Levaquin initiated for possible UTI. Advised to f/u with GYN (sono performed). Next GI.  \par GI advised CT scan. This revealed SC microperforation. Hospitalized at [facility] x4days. IV ABX. Discharged on oral ABX.\par Presently feeling fatigued. Left sided "twinges" when working. Maintaining LRD. 15lb wt loss since admission.\par \par Hx IBS (diarrhea alt w/constipation). 4th degree tear post  ().\par \par

## 2022-12-20 NOTE — PHYSICAL EXAM
[Normal Breath Sounds] : Normal breath sounds [Normal Heart Sounds] : normal heart sounds [Normal Rate and Rhythm] : normal rate and rhythm [No Edema] : No edema [Alert] : alert [Oriented to Person] : oriented to person [Oriented to Place] : oriented to place [Oriented to Time] : oriented to time [Anxious] : anxious [de-identified] : round soft +BS NT/ND [de-identified] : NC/AT [de-identified] : +ROM [de-identified] : intact

## 2022-12-20 NOTE — REVIEW OF SYSTEMS
[Feeling Tired] : feeling tired [Recent Weight Loss (___ Lbs)] : recent [unfilled] ~Ulb weight loss [As Noted in HPI] : as noted in HPI [Negative] : Endocrine [Chest Pain] : no chest pain [Shortness Of Breath] : no shortness of breath [Easy Bleeding] : no tendency for easy bleeding [Easy Bruising] : no tendency for easy bruising [de-identified] : hx migraines

## 2022-12-20 NOTE — ASSESSMENT
[FreeTextEntry1] : 57-year-old female who presents for consultation regarding diverticulitis.\par \par Patient underwent a colonoscopy in April 2022 where diverticulosis was found and supposedly inflammatory changes consistent with diverticulitis were also seen.  The patient was treated with 10 days of antibiotics.  She did well until approximately mid November when she developed significant lower abdominal pain.  This prompted her to go to an urgent care center where she was initially treated for urinary tract infection but told to follow-up with GYN.  When the pain persisted she saw her gynecologist and a sonogram ruled out gynecologic issues as the etiology of her pain.  She reconsulted with her gastroenterologist who sent her for a CAT scan.  This revealed a locally perforated diverticulitis.  The patient was admitted to the hospital for 4 days of intravenous antibiotics.  She was then discharged home to complete a course of oral antibiotics.  Currently the severe lower abdominal pain is past but she still occasionally will experience some left lateral abdominal pain higher in the abdomen.  She does have a history of irritable bowel syndrome since her early 20s.\par \par Her past medical history is otherwise insignificant other than migraines.  She has never had any prior abdominal surgery though she supposedly did have 4th degree laceration with vaginal childbirth.  She denies incontinence or fecal urgency.\par \par Patient has both irritable bowel syndrome and one episode of complicated diverticulitis with microperforation.  For now I have advised her to remain on a low residue diet for 6 weeks but be sure that she has a high fluid intake.  One school of thought would be to perform resection after one bout of complicated diverticulitis.  Another approach could be to see what happens and whether or not the patient develops recurrent symptomatology consistent with recurrent diverticulitis.  For educational purposes we discussed laparoscopic approach including anticipated operative time, hospital stay and time to complete recuperation.  Risks, alternatives and benefits including but not limited to anastomotic leak, wound infection and deep venous thrombosis were discussed.\par \par For now the plan is for her to simply observe and see if recurrent episodes develop.

## 2023-01-25 ENCOUNTER — OUTPATIENT (OUTPATIENT)
Dept: OUTPATIENT SERVICES | Facility: HOSPITAL | Age: 58
LOS: 1 days | End: 2023-01-25
Payer: COMMERCIAL

## 2023-01-25 VITALS
TEMPERATURE: 98 F | RESPIRATION RATE: 18 BRPM | SYSTOLIC BLOOD PRESSURE: 107 MMHG | HEIGHT: 64 IN | WEIGHT: 164.02 LBS | OXYGEN SATURATION: 98 % | HEART RATE: 62 BPM | DIASTOLIC BLOOD PRESSURE: 73 MMHG

## 2023-01-25 DIAGNOSIS — G43.909 MIGRAINE, UNSPECIFIED, NOT INTRACTABLE, WITHOUT STATUS MIGRAINOSUS: ICD-10-CM

## 2023-01-25 DIAGNOSIS — K57.32 DIVERTICULITIS OF LARGE INTESTINE WITHOUT PERFORATION OR ABSCESS WITHOUT BLEEDING: ICD-10-CM

## 2023-01-25 DIAGNOSIS — K57.92 DIVERTICULITIS OF INTESTINE, PART UNSPECIFIED, WITHOUT PERFORATION OR ABSCESS WITHOUT BLEEDING: ICD-10-CM

## 2023-01-25 DIAGNOSIS — Z98.890 OTHER SPECIFIED POSTPROCEDURAL STATES: Chronic | ICD-10-CM

## 2023-01-25 DIAGNOSIS — Z01.818 ENCOUNTER FOR OTHER PREPROCEDURAL EXAMINATION: ICD-10-CM

## 2023-01-25 DIAGNOSIS — Z29.9 ENCOUNTER FOR PROPHYLACTIC MEASURES, UNSPECIFIED: ICD-10-CM

## 2023-01-25 LAB
A1C WITH ESTIMATED AVERAGE GLUCOSE RESULT: 5.5 % — SIGNIFICANT CHANGE UP (ref 4–5.6)
ANION GAP SERPL CALC-SCNC: 12 MMOL/L — SIGNIFICANT CHANGE UP (ref 5–17)
BLD GP AB SCN SERPL QL: NEGATIVE — SIGNIFICANT CHANGE UP
BUN SERPL-MCNC: 14 MG/DL — SIGNIFICANT CHANGE UP (ref 7–23)
CALCIUM SERPL-MCNC: 9.5 MG/DL — SIGNIFICANT CHANGE UP (ref 8.4–10.5)
CHLORIDE SERPL-SCNC: 101 MMOL/L — SIGNIFICANT CHANGE UP (ref 96–108)
CO2 SERPL-SCNC: 24 MMOL/L — SIGNIFICANT CHANGE UP (ref 22–31)
CREAT SERPL-MCNC: 0.72 MG/DL — SIGNIFICANT CHANGE UP (ref 0.5–1.3)
EGFR: 98 ML/MIN/1.73M2 — SIGNIFICANT CHANGE UP
ESTIMATED AVERAGE GLUCOSE: 111 MG/DL — SIGNIFICANT CHANGE UP (ref 68–114)
GLUCOSE SERPL-MCNC: 115 MG/DL — HIGH (ref 70–99)
HCT VFR BLD CALC: 40.9 % — SIGNIFICANT CHANGE UP (ref 34.5–45)
HGB BLD-MCNC: 13.2 G/DL — SIGNIFICANT CHANGE UP (ref 11.5–15.5)
MCHC RBC-ENTMCNC: 27.4 PG — SIGNIFICANT CHANGE UP (ref 27–34)
MCHC RBC-ENTMCNC: 32.3 GM/DL — SIGNIFICANT CHANGE UP (ref 32–36)
MCV RBC AUTO: 84.9 FL — SIGNIFICANT CHANGE UP (ref 80–100)
NRBC # BLD: 0 /100 WBCS — SIGNIFICANT CHANGE UP (ref 0–0)
PLATELET # BLD AUTO: 338 K/UL — SIGNIFICANT CHANGE UP (ref 150–400)
POTASSIUM SERPL-MCNC: 3.9 MMOL/L — SIGNIFICANT CHANGE UP (ref 3.5–5.3)
POTASSIUM SERPL-SCNC: 3.9 MMOL/L — SIGNIFICANT CHANGE UP (ref 3.5–5.3)
RBC # BLD: 4.82 M/UL — SIGNIFICANT CHANGE UP (ref 3.8–5.2)
RBC # FLD: 14.6 % — HIGH (ref 10.3–14.5)
RH IG SCN BLD-IMP: POSITIVE — SIGNIFICANT CHANGE UP
SODIUM SERPL-SCNC: 137 MMOL/L — SIGNIFICANT CHANGE UP (ref 135–145)
WBC # BLD: 6.85 K/UL — SIGNIFICANT CHANGE UP (ref 3.8–10.5)
WBC # FLD AUTO: 6.85 K/UL — SIGNIFICANT CHANGE UP (ref 3.8–10.5)

## 2023-01-25 PROCEDURE — 86900 BLOOD TYPING SEROLOGIC ABO: CPT

## 2023-01-25 PROCEDURE — 83036 HEMOGLOBIN GLYCOSYLATED A1C: CPT

## 2023-01-25 PROCEDURE — 87086 URINE CULTURE/COLONY COUNT: CPT

## 2023-01-25 PROCEDURE — 80048 BASIC METABOLIC PNL TOTAL CA: CPT

## 2023-01-25 PROCEDURE — 86901 BLOOD TYPING SEROLOGIC RH(D): CPT

## 2023-01-25 PROCEDURE — G0463: CPT

## 2023-01-25 PROCEDURE — 85027 COMPLETE CBC AUTOMATED: CPT

## 2023-01-25 PROCEDURE — 86850 RBC ANTIBODY SCREEN: CPT

## 2023-01-25 RX ORDER — CEFOTETAN DISODIUM 1 G
2 VIAL (EA) INJECTION ONCE
Refills: 0 | Status: DISCONTINUED | OUTPATIENT
Start: 2023-02-07 | End: 2023-02-10

## 2023-01-25 NOTE — H&P PST ADULT - HISTORY OF PRESENT ILLNESS
57 yo female presents to PST prior to scheduled [facility] Laparoscopic low anterior resection on 2/7/23 with Dr. Richard Abraham. Pmhx includes diverticulosis and migraines.     Reports had Covid19 in 4/2020 - sinus symptoms.   Covid19 PCR on 2/4/23 at [facility] [place] [facility]. 57 yo female presents to PST prior to scheduled [facility] Laparoscopic low anterior resection on 2/7/23 with Dr. Richard Abraham. Pmhx includes diverticulosis and migraines. Hospitalized at [facility] for perforated diverticulum 11/10-11/13/2022. Patient endorses "twinging" in LLQ with certain movements (bending forward) and unintentional weight loss of 15 lbs since hospitalization in November 2022. Denies fevers, chills, chest pain, palpitations, SOB/HAND, dizziness, syncope, N/V/diarrhea. Recently evaluated by Dr. Abraham and above surgery was recommended.    Reports had Covid19 in 4/2020 - sinus symptoms only.   Covid19 PCR on 2/4/23 at [facility].  Patient denies recent Covid19 infection/exposure,recent travel,fevers,chills,cough,SOB,loss of sense of smell/taste.

## 2023-01-25 NOTE — H&P PST ADULT - WEIGHT IN LBS
History of Present Illness    Jp Johns is a 55 year old male being seen for neck pain for one week. He previously had a sore throat but the sore throat resolved completely. Now he feels pain the very bottom of his neck. The pain is constant. It is triggered by any movement such as swallowing turning his head extending his head backwards. He has had this problem once before, 3 years ago, and it resolved after one or 2 months without any intervention. He said he could not afford the US at that time. Denies any changes in skin, hair, nails, weight. States he has constant pain of his back and knees which is not new.    Past Medical History     Past Medical History:   Diagnosis Date   • Back injury     Work injury- March 2 2012/ disk fusion sugery   • Hyperlipidemia LDL goal < 130      Health Maintenance     Health Maintenance   Topic Date Due   • Colorectal Cancer Screening-Fecal Occult Blood  08/24/2017   • Influenza Vaccine (1) 02/08/2018 (Originally 9/1/2017)   • Depression Screening  11/11/2018   • DTaP/Tdap/Td Vaccine (2 - Td) 04/14/2025   • Hepatitis C Screening  Completed     Allergies    ALLERGIES:  No Known Allergies    Medications     Current Outpatient Prescriptions   Medication Sig Dispense Refill   • gemfibrozil (LOPID) 600 MG tablet Take 1 tablet by mouth 2 times daily (before meals). 180 tablet 3   • pantoprazole (PROTONIX) 20 MG tablet Take 1 tablet by mouth daily. 90 tablet 3   • Lidocaine HCl (60 ML EACH OF LIDOCAINE,DIPHENHYDRAMINE, AND MAALOX) Gargle and spit every 4 hours as needed. 8 oz 0   • Omega 3 1200 MG CAPS Take 1,200 mg by mouth 2 times daily. 180 capsule 3   • Gabapentin (NEURONTIN PO) Take  by mouth.       No current facility-administered medications for this visit.      Review of Systems   No fever, bodyaches, chills , visual disturbances, weakness, sore throat, cough, congestion, chest pain, sob, abdominal pain, leg swelling, or paresthesia.    Physical Exam      Vitals:     02/06/18 1042   BP: 110/74   Pulse: 65   Temp: 98.1 °F (36.7 °C)   TempSrc: Oral   SpO2: 97%   Weight: 87.3 kg   Height: 5' 6.5\" (1.689 m)      Body mass index is 30.59 kg/m².  General: Alert and oriented. Cooperative, no acute distress. Well groomed.   HEENT:  Atraumatic. PERRL. TM's pearly grey  light reflex noted bilaterally.  Turbinates pink and moist.  Pharynx pink and without exudate. Mucous membranes moist, oral cavity without lesions.  Neck: Tender with palpation of thyroid and just below thyroid. I could not appreciate any lumps of thyroid. Full ROM noted. No lymphadenopathy. Trachea is midline.   Cardiovascular: Regular S1 and S2. No S3, S4 murmurs or thrills.  Pulmonary: CTA.  No use of accessory muscles, intercostal retraction or respiratory distress noted.     Assessment and Plan      Anterior neck pain over thyroid  Thyroid US ordered  TSH ordered.   Advil for pain    Follow up: 3 weeks  Jihan Lopez NP     [phone]

## 2023-01-25 NOTE — H&P PST ADULT - PROBLEM SELECTOR PLAN 1
ERAS, Laparoscopic low anterior resection with Dr. Richard Abraham on 2/7/23.  Pre-op instructions given. Questions answered.  Covid19 PCR on 2/4/23 at [facility].

## 2023-01-25 NOTE — H&P PST ADULT - ASSESSMENT
Denies loose teeth/dentures.    CAPRINI SCORE [CLOT]    AGE RELATED RISK FACTORS                                                       MOBILITY RELATED FACTORS  [X ] Age 41-60 years                                            (1 Point)                  [ ] Bed rest                                                        (1 Point)  [ ] Age: 61-74 years                                           (2 Points)                 [ ] Plaster cast                                                   (2 Points)  [ ] Age= 75 years                                              (3 Points)                 [ ] Bed bound for more than 72 hours                 (2 Points)    DISEASE RELATED RISK FACTORS                                               GENDER SPECIFIC FACTORS  [ ] Edema in the lower extremities                       (1 Point)                  [ ] Pregnancy                                                     (1 Point)  [ ] Varicose veins                                               (1 Point)                  [ ] Post-partum < 6 weeks                                   (1 Point)             [X ] BMI > 25 Kg/m2                                            (1 Point)                  [ ] Hormonal therapy  or oral contraception          (1 Point)                 [ ] Sepsis (in the previous month)                        (1 Point)                  [ ] History of pregnancy complications                 (1 point)  [ ] Pneumonia or serious lung disease                                               [ ] Unexplained or recurrent                     (1 Point)           (in the previous month)                               (1 Point)  [ ] Abnormal pulmonary function test                     (1 Point)                 SURGERY RELATED RISK FACTORS  [ ] Acute myocardial infarction                              (1 Point)                 [ ]  Section                                             (1 Point)  [ ] Congestive heart failure (in the previous month)  (1 Point)               [ ] Minor surgery                                                  (1 Point)   [X ] Inflammatory bowel disease                             (1 Point)                 [ ] Arthroscopic surgery                                        (2 Points)  [ ] Central venous access                                      (2 Points)                [X ] General surgery lasting more than 45 minutes   (2 Points)       [ ] Stroke (in the previous month)                          (5 Points)               [ ] Elective arthroplasty                                         (5 Points)                                                                                                                                               HEMATOLOGY RELATED FACTORS                                                 TRAUMA RELATED RISK FACTORS  [ ] Prior episodes of VTE                                     (3 Points)                [ ] Fracture of the hip, pelvis, or leg                       (5 Points)  [ ] Positive family history for VTE                         (3 Points)                 [ ] Acute spinal cord injury (in the previous month)  (5 Points)  [ ] Prothrombin 90409 A                                     (3 Points)                 [ ] Paralysis  (less than 1 month)                             (5 Points)  [ ] Factor V Leiden                                             (3 Points)                  [ ] Multiple Trauma within 1 month                        (5 Points)  [ ] Lupus anticoagulants                                     (3 Points)                                                           [ ] Anticardiolipin antibodies                               (3 Points)                                                       [ ] High homocysteine in the blood                      (3 Points)                                             [ ] Other congenital or acquired thrombophilia      (3 Points)                                                [ ] Heparin induced thrombocytopenia                  (3 Points)                                          Total Score [    5      ]    Caprini Score 0 - 2:  Low Risk, No VTE Prophylaxis required for most patients, encourage ambulation  Caprini Score 3 - 6:  At Risk, pharmacologic VTE prophylaxis is indicated for most patients (in the absence of a contraindication)  Caprini Score Greater than or = 7:  High Risk, pharmacologic VTE prophylaxis is indicated for most patients (in the absence of a contraindication)
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
## 2023-01-25 NOTE — H&P PST ADULT - FUNCTIONAL STATUS
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

## 2023-01-27 LAB
CULTURE RESULTS: SIGNIFICANT CHANGE UP
SPECIMEN SOURCE: SIGNIFICANT CHANGE UP

## 2023-01-31 ENCOUNTER — NON-APPOINTMENT (OUTPATIENT)
Age: 58
End: 2023-01-31

## 2023-01-31 ENCOUNTER — APPOINTMENT (OUTPATIENT)
Dept: CARDIOLOGY | Facility: CLINIC | Age: 58
End: 2023-01-31
Payer: COMMERCIAL

## 2023-01-31 VITALS
BODY MASS INDEX: 28.68 KG/M2 | OXYGEN SATURATION: 99 % | DIASTOLIC BLOOD PRESSURE: 82 MMHG | HEART RATE: 66 BPM | WEIGHT: 168 LBS | HEIGHT: 64 IN | SYSTOLIC BLOOD PRESSURE: 124 MMHG

## 2023-01-31 PROCEDURE — 93000 ELECTROCARDIOGRAM COMPLETE: CPT

## 2023-01-31 PROCEDURE — 99214 OFFICE O/P EST MOD 30 MIN: CPT | Mod: 25

## 2023-01-31 NOTE — DISCUSSION/SUMMARY
[EKG obtained to assist in diagnosis and management of assessed problem(s)] : EKG obtained to assist in diagnosis and management of assessed problem(s) [FreeTextEntry1] : Here for cardiac pre op cardiac evaluation \par Cardiac testing/EKG reviewed patient asymptomatic from cardiac standpoint, VSS\par Patient cleared from cardiac standpoint to proceed with proposed surgery\par Will follow up post op \par  \par Palpitations: Resolved advised to decrease caffeine intake and maintain adequate hydration \par \par MR: Mild by Echo\par \par Migraine/Cluster HA's:  Currently controlled is followed with Neurology \par \par \par \par

## 2023-01-31 NOTE — HISTORY OF PRESENT ILLNESS
[FreeTextEntry1] : 57 y/o female PMH: Palpitations ( APC, PVC,s SVT ), MR, Headache followed with Neurology, Uterine polyps S/P D&C 7/2019 followed with GYN, COVID, Diverticular disease hospitalized [facility] for perforated diverticulum 11/10-11/13/2022 who present today for cardiac pre op evaluation prior to Laparoscopic low anterior resection on 2/7/23 with Dr. Rihcard Abraham @ [facility]\par \par Stress test No ischemic EKG changes \par Echo: NL LV FX Mild MR\par \par Currently Claims  to be feeling well no active cardiac complaints\par No prior H/O anesthesia reaction\par No FH SCD

## 2023-01-31 NOTE — CARDIOLOGY SUMMARY
[de-identified] : 1/31/23  [de-identified] : 9/1/20 10 METS 86%MPHR Rare PVCS  [de-identified] : 9/14/20 EF 65% Mild MR\par 9/12/22 NL LV SYS FX EF 68% Mild MR

## 2023-02-05 LAB — SARS-COV-2 N GENE NPH QL NAA+PROBE: NOT DETECTED

## 2023-02-06 ENCOUNTER — TRANSCRIPTION ENCOUNTER (OUTPATIENT)
Age: 58
End: 2023-02-06

## 2023-02-07 ENCOUNTER — INPATIENT (INPATIENT)
Facility: HOSPITAL | Age: 58
LOS: 2 days | Discharge: HOME | DRG: 331 | End: 2023-02-10
Attending: SURGERY | Admitting: SURGERY
Payer: COMMERCIAL

## 2023-02-07 ENCOUNTER — APPOINTMENT (OUTPATIENT)
Dept: COLORECTAL SURGERY | Facility: HOSPITAL | Age: 58
End: 2023-02-07
Payer: COMMERCIAL

## 2023-02-07 ENCOUNTER — RESULT REVIEW (OUTPATIENT)
Age: 58
End: 2023-02-07

## 2023-02-07 VITALS
HEART RATE: 62 BPM | TEMPERATURE: 98 F | RESPIRATION RATE: 18 BRPM | OXYGEN SATURATION: 99 % | SYSTOLIC BLOOD PRESSURE: 139 MMHG | HEIGHT: 64 IN | WEIGHT: 164.02 LBS | DIASTOLIC BLOOD PRESSURE: 84 MMHG

## 2023-02-07 DIAGNOSIS — Z98.890 OTHER SPECIFIED POSTPROCEDURAL STATES: Chronic | ICD-10-CM

## 2023-02-07 DIAGNOSIS — K57.32 DIVERTICULITIS OF LARGE INTESTINE WITHOUT PERFORATION OR ABSCESS WITHOUT BLEEDING: ICD-10-CM

## 2023-02-07 LAB — GLUCOSE BLDC GLUCOMTR-MCNC: 67 MG/DL — LOW (ref 70–99)

## 2023-02-07 PROCEDURE — 45300 PROCTOSIGMOIDOSCOPY DX: CPT

## 2023-02-07 PROCEDURE — 44207 L COLECTOMY/COLOPROCTOSTOMY: CPT

## 2023-02-07 PROCEDURE — 88307 TISSUE EXAM BY PATHOLOGIST: CPT | Mod: 26

## 2023-02-07 PROCEDURE — 52005 CYSTO W/URTRL CATHJ: CPT

## 2023-02-07 DEVICE — STAPLER ETHICON CIRCULAR XL 29MM: Type: IMPLANTABLE DEVICE | Status: FUNCTIONAL

## 2023-02-07 DEVICE — URETERAL CATH OPEN END 5FR 70CM: Type: IMPLANTABLE DEVICE | Status: FUNCTIONAL

## 2023-02-07 DEVICE — STAPLER CONTOUR CURVED WITH BLUE CART: Type: IMPLANTABLE DEVICE | Status: FUNCTIONAL

## 2023-02-07 DEVICE — URETERAL CATH WHISTLE TIP 5FR LEFT: Type: IMPLANTABLE DEVICE | Status: FUNCTIONAL

## 2023-02-07 DEVICE — VISTASEAL FIBRIN HUMAN 4ML: Type: IMPLANTABLE DEVICE | Status: FUNCTIONAL

## 2023-02-07 DEVICE — STAPLER COVIDIEN PURSTRING 65MM: Type: IMPLANTABLE DEVICE | Status: FUNCTIONAL

## 2023-02-07 RX ORDER — SODIUM CHLORIDE 9 MG/ML
1000 INJECTION, SOLUTION INTRAVENOUS
Refills: 0 | Status: DISCONTINUED | OUTPATIENT
Start: 2023-02-07 | End: 2023-02-09

## 2023-02-07 RX ORDER — TOPIRAMATE 25 MG
1 TABLET ORAL
Qty: 0 | Refills: 0 | DISCHARGE

## 2023-02-07 RX ORDER — IBUPROFEN 200 MG
400 TABLET ORAL EVERY 6 HOURS
Refills: 0 | Status: DISCONTINUED | OUTPATIENT
Start: 2023-02-09 | End: 2023-02-10

## 2023-02-07 RX ORDER — OXYCODONE HYDROCHLORIDE 5 MG/1
5 TABLET ORAL EVERY 4 HOURS
Refills: 0 | Status: DISCONTINUED | OUTPATIENT
Start: 2023-02-07 | End: 2023-02-10

## 2023-02-07 RX ORDER — MORPHINE SULFATE 50 MG/1
0.15 CAPSULE, EXTENDED RELEASE ORAL ONCE
Refills: 0 | Status: DISCONTINUED | OUTPATIENT
Start: 2023-02-07 | End: 2023-02-08

## 2023-02-07 RX ORDER — KETOROLAC TROMETHAMINE 30 MG/ML
15 SYRINGE (ML) INJECTION EVERY 6 HOURS
Refills: 0 | Status: DISCONTINUED | OUTPATIENT
Start: 2023-02-07 | End: 2023-02-08

## 2023-02-07 RX ORDER — SODIUM CHLORIDE 9 MG/ML
3 INJECTION INTRAMUSCULAR; INTRAVENOUS; SUBCUTANEOUS EVERY 8 HOURS
Refills: 0 | Status: DISCONTINUED | OUTPATIENT
Start: 2023-02-07 | End: 2023-02-07

## 2023-02-07 RX ORDER — ACETAMINOPHEN 500 MG
1000 TABLET ORAL EVERY 6 HOURS
Refills: 0 | Status: DISCONTINUED | OUTPATIENT
Start: 2023-02-09 | End: 2023-02-10

## 2023-02-07 RX ORDER — TOPIRAMATE 25 MG
25 TABLET ORAL
Refills: 0 | Status: DISCONTINUED | OUTPATIENT
Start: 2023-02-07 | End: 2023-02-10

## 2023-02-07 RX ORDER — ONDANSETRON 8 MG/1
4 TABLET, FILM COATED ORAL EVERY 6 HOURS
Refills: 0 | Status: DISCONTINUED | OUTPATIENT
Start: 2023-02-07 | End: 2023-02-08

## 2023-02-07 RX ORDER — HEPARIN SODIUM 5000 [USP'U]/ML
5000 INJECTION INTRAVENOUS; SUBCUTANEOUS EVERY 8 HOURS
Refills: 0 | Status: DISCONTINUED | OUTPATIENT
Start: 2023-02-07 | End: 2023-02-10

## 2023-02-07 RX ORDER — CHLORHEXIDINE GLUCONATE 213 G/1000ML
1 SOLUTION TOPICAL ONCE
Refills: 0 | Status: DISCONTINUED | OUTPATIENT
Start: 2023-02-07 | End: 2023-02-07

## 2023-02-07 RX ORDER — NALOXONE HYDROCHLORIDE 4 MG/.1ML
0.1 SPRAY NASAL
Refills: 0 | Status: DISCONTINUED | OUTPATIENT
Start: 2023-02-07 | End: 2023-02-08

## 2023-02-07 RX ORDER — HYDROMORPHONE HYDROCHLORIDE 2 MG/ML
0.5 INJECTION INTRAMUSCULAR; INTRAVENOUS; SUBCUTANEOUS
Refills: 0 | Status: DISCONTINUED | OUTPATIENT
Start: 2023-02-07 | End: 2023-02-07

## 2023-02-07 RX ORDER — ONDANSETRON 8 MG/1
4 TABLET, FILM COATED ORAL ONCE
Refills: 0 | Status: DISCONTINUED | OUTPATIENT
Start: 2023-02-07 | End: 2023-02-07

## 2023-02-07 RX ORDER — CELECOXIB 200 MG/1
400 CAPSULE ORAL ONCE
Refills: 0 | Status: COMPLETED | OUTPATIENT
Start: 2023-02-07 | End: 2023-02-07

## 2023-02-07 RX ORDER — ACETAMINOPHEN 500 MG
1000 TABLET ORAL EVERY 6 HOURS
Refills: 0 | Status: COMPLETED | OUTPATIENT
Start: 2023-02-07 | End: 2023-02-08

## 2023-02-07 RX ORDER — OXYCODONE HYDROCHLORIDE 5 MG/1
2.5 TABLET ORAL EVERY 4 HOURS
Refills: 0 | Status: DISCONTINUED | OUTPATIENT
Start: 2023-02-07 | End: 2023-02-10

## 2023-02-07 RX ORDER — GABAPENTIN 400 MG/1
600 CAPSULE ORAL ONCE
Refills: 0 | Status: COMPLETED | OUTPATIENT
Start: 2023-02-07 | End: 2023-02-07

## 2023-02-07 RX ADMIN — Medication 15 MILLIGRAM(S): at 18:25

## 2023-02-07 RX ADMIN — Medication 25 MILLIGRAM(S): at 18:25

## 2023-02-07 RX ADMIN — SODIUM CHLORIDE 40 MILLILITER(S): 9 INJECTION, SOLUTION INTRAVENOUS at 14:44

## 2023-02-07 RX ADMIN — Medication 1000 MILLIGRAM(S): at 23:59

## 2023-02-07 RX ADMIN — HEPARIN SODIUM 5000 UNIT(S): 5000 INJECTION INTRAVENOUS; SUBCUTANEOUS at 23:29

## 2023-02-07 RX ADMIN — HEPARIN SODIUM 5000 UNIT(S): 5000 INJECTION INTRAVENOUS; SUBCUTANEOUS at 18:25

## 2023-02-07 RX ADMIN — Medication 400 MILLIGRAM(S): at 23:29

## 2023-02-07 RX ADMIN — Medication 15 MILLIGRAM(S): at 18:40

## 2023-02-07 RX ADMIN — ONDANSETRON 4 MILLIGRAM(S): 8 TABLET, FILM COATED ORAL at 16:32

## 2023-02-07 RX ADMIN — CELECOXIB 400 MILLIGRAM(S): 200 CAPSULE ORAL at 07:05

## 2023-02-07 RX ADMIN — Medication 400 MILLIGRAM(S): at 18:25

## 2023-02-07 RX ADMIN — Medication 1000 MILLIGRAM(S): at 18:40

## 2023-02-07 RX ADMIN — GABAPENTIN 600 MILLIGRAM(S): 400 CAPSULE ORAL at 07:05

## 2023-02-07 NOTE — BRIEF OPERATIVE NOTE - OPERATION/FINDINGS
7 cm lower midline laparotomy incision made and large wound protector placed. The sigmoid colon was mobilized anteriorly and medially, the left ureter was identified and protected. The right sided dissection commenced with care taken to protect the right ureter; dissection was carried out until the left and right dissection planes met. The posterior mesorectum was dissected off the presacral fascia. Sizers were inserted to select the distal site of transection and the bowel was prepared and the mesorectum was divided at this level. The gel port was then placed and a Jennifer trocar was placed in the supraumbilical position. The descending colon was mobilized along the white line of toldt and our proximal transection zone was selected on the descending colon; after our lateral mobilization this portion of bowel easily reached the pelvis. The large wound protector was placed and the proximal rectum was transected. Extra-corporeally, the proximal resection zone was prepared on the distal descending colon, and the bowel was divided using the automatic pursestring device. An end to end anastomosis was done using the EEA 29 mm stapler. There was no leak on rigid proctoscopy and the anastomosis was 12-13 cm from the anal verge. The midline fascia was closed with #1 maxon in interrupted figure of eight fashion. The Jennifer trocar site was closed with 0 vicryl. Skin closed with staples. Patient tolerated procedure well.

## 2023-02-07 NOTE — PATIENT PROFILE ADULT - TOBACCO USE
No eye exam on file. Please advise. Thank you.  
Pt confirms taking 200 mg BID. Will send to Rheumatology for review.   
Will fill 1 month   Needs to get eye exam before further refills     
Never smoker

## 2023-02-07 NOTE — CHART NOTE - NSCHARTNOTEFT_GEN_A_CORE
Surgery Post op Check    Pt seen and examined at bedside in PACU. Patient noted to be groggy. States she feels tired and has responded this way to previous recoveries from anesthesia. Has some dizziness and nausea. Tolerated few sips of water. Denies sob/CP.    Noted to be laura 45-60's. Per RN, HR in 40-50s when sleeping and 50-60s while awake. Pre op HR 62.     Vital Signs Last 24 Hrs  T(C): 36.5 (07 Feb 2023 14:00), Max: 36.5 (07 Feb 2023 14:00)  T(F): 97.7 (07 Feb 2023 14:00), Max: 97.7 (07 Feb 2023 14:00)  HR: 47 (07 Feb 2023 16:00) (47 - 65)  BP: 100/58 (07 Feb 2023 16:00) (99/58 - 139/84)  BP(mean): 77 (07 Feb 2023 16:00) (74 - 88)  RR: 15 (07 Feb 2023 16:00) (12 - 18)  SpO2: 100% (07 Feb 2023 16:00) (94% - 100%)    Parameters below as of 07 Feb 2023 16:00  Patient On (Oxygen Delivery Method): nasal cannula  O2 Flow (L/min): 2      Physical Exam  Gen: NAD, A&Ox3  Pulm: No respiratory distress, no subcostal retractions  CV: RRR, no JVD  Abd: Soft, NT, ND, opsites c/d/i   Extremities:  FROM, warm and well perfused, equal bilateral muscle strength  : Rolle in place with blood tinge urine     I&O's Summary    07 Feb 2023 07:01  -  07 Feb 2023 16:38  --------------------------------------------------------  IN: 160 mL / OUT: 150 mL / NET: 10 mL          A/P: 58y Female with diverticulitis s/p lap assisted anterior resection on 2/7     -DVT prophylaxis w SQH tid   -ERP   -monitor vitals given bradycardia   -reassess in few hours   -Analgesia and antiemetics as needed  -CLD   -remove rolle and left stent in AM      RED p 5587

## 2023-02-07 NOTE — PATIENT PROFILE ADULT - FALL HARM RISK - HARM RISK INTERVENTIONS

## 2023-02-08 LAB
ANION GAP SERPL CALC-SCNC: 10 MMOL/L — SIGNIFICANT CHANGE UP (ref 5–17)
BUN SERPL-MCNC: 12 MG/DL — SIGNIFICANT CHANGE UP (ref 7–23)
CALCIUM SERPL-MCNC: 8.3 MG/DL — LOW (ref 8.4–10.5)
CHLORIDE SERPL-SCNC: 103 MMOL/L — SIGNIFICANT CHANGE UP (ref 96–108)
CO2 SERPL-SCNC: 25 MMOL/L — SIGNIFICANT CHANGE UP (ref 22–31)
CREAT SERPL-MCNC: 0.72 MG/DL — SIGNIFICANT CHANGE UP (ref 0.5–1.3)
EGFR: 98 ML/MIN/1.73M2 — SIGNIFICANT CHANGE UP
GLUCOSE SERPL-MCNC: 139 MG/DL — HIGH (ref 70–99)
HCT VFR BLD CALC: 33.7 % — LOW (ref 34.5–45)
HGB BLD-MCNC: 10.8 G/DL — LOW (ref 11.5–15.5)
MAGNESIUM SERPL-MCNC: 2.1 MG/DL — SIGNIFICANT CHANGE UP (ref 1.6–2.6)
MCHC RBC-ENTMCNC: 27.2 PG — SIGNIFICANT CHANGE UP (ref 27–34)
MCHC RBC-ENTMCNC: 32 GM/DL — SIGNIFICANT CHANGE UP (ref 32–36)
MCV RBC AUTO: 84.9 FL — SIGNIFICANT CHANGE UP (ref 80–100)
NRBC # BLD: 0 /100 WBCS — SIGNIFICANT CHANGE UP (ref 0–0)
PHOSPHATE SERPL-MCNC: 3.9 MG/DL — SIGNIFICANT CHANGE UP (ref 2.5–4.5)
PLATELET # BLD AUTO: 288 K/UL — SIGNIFICANT CHANGE UP (ref 150–400)
POTASSIUM SERPL-MCNC: 3.7 MMOL/L — SIGNIFICANT CHANGE UP (ref 3.5–5.3)
POTASSIUM SERPL-SCNC: 3.7 MMOL/L — SIGNIFICANT CHANGE UP (ref 3.5–5.3)
RBC # BLD: 3.97 M/UL — SIGNIFICANT CHANGE UP (ref 3.8–5.2)
RBC # FLD: 14.5 % — SIGNIFICANT CHANGE UP (ref 10.3–14.5)
SODIUM SERPL-SCNC: 138 MMOL/L — SIGNIFICANT CHANGE UP (ref 135–145)
WBC # BLD: 8.52 K/UL — SIGNIFICANT CHANGE UP (ref 3.8–10.5)
WBC # FLD AUTO: 8.52 K/UL — SIGNIFICANT CHANGE UP (ref 3.8–10.5)

## 2023-02-08 RX ORDER — ACETAMINOPHEN 500 MG
1000 TABLET ORAL ONCE
Refills: 0 | Status: COMPLETED | OUTPATIENT
Start: 2023-02-08 | End: 2023-02-08

## 2023-02-08 RX ORDER — MAGNESIUM OXIDE 400 MG ORAL TABLET 241.3 MG
1000 TABLET ORAL
Refills: 0 | Status: DISCONTINUED | OUTPATIENT
Start: 2023-02-08 | End: 2023-02-10

## 2023-02-08 RX ADMIN — Medication 400 MILLIGRAM(S): at 12:11

## 2023-02-08 RX ADMIN — Medication 400 MILLIGRAM(S): at 19:40

## 2023-02-08 RX ADMIN — HEPARIN SODIUM 5000 UNIT(S): 5000 INJECTION INTRAVENOUS; SUBCUTANEOUS at 23:42

## 2023-02-08 RX ADMIN — Medication 15 MILLIGRAM(S): at 15:51

## 2023-02-08 RX ADMIN — Medication 400 MILLIGRAM(S): at 23:42

## 2023-02-08 RX ADMIN — Medication 15 MILLIGRAM(S): at 03:09

## 2023-02-08 RX ADMIN — MAGNESIUM OXIDE 400 MG ORAL TABLET 1000 MILLIGRAM(S): 241.3 TABLET ORAL at 10:30

## 2023-02-08 RX ADMIN — HEPARIN SODIUM 5000 UNIT(S): 5000 INJECTION INTRAVENOUS; SUBCUTANEOUS at 15:45

## 2023-02-08 RX ADMIN — Medication 25 MILLIGRAM(S): at 19:11

## 2023-02-08 RX ADMIN — Medication 1000 MILLIGRAM(S): at 20:00

## 2023-02-08 RX ADMIN — SODIUM CHLORIDE 40 MILLILITER(S): 9 INJECTION, SOLUTION INTRAVENOUS at 19:41

## 2023-02-08 RX ADMIN — Medication 15 MILLIGRAM(S): at 10:30

## 2023-02-08 RX ADMIN — Medication 1000 MILLIGRAM(S): at 05:36

## 2023-02-08 RX ADMIN — Medication 15 MILLIGRAM(S): at 02:39

## 2023-02-08 RX ADMIN — HEPARIN SODIUM 5000 UNIT(S): 5000 INJECTION INTRAVENOUS; SUBCUTANEOUS at 05:06

## 2023-02-08 RX ADMIN — Medication 400 MILLIGRAM(S): at 05:06

## 2023-02-08 RX ADMIN — Medication 25 MILLIGRAM(S): at 05:06

## 2023-02-08 NOTE — PROVIDER CONTACT NOTE (OTHER) - SITUATION
Pt's BP is soft - systolic is in the 90's.  Pt also c/o feeling discharge from vagina, and RN looked and saw brown discharge.

## 2023-02-08 NOTE — PROGRESS NOTE ADULT - SUBJECTIVE AND OBJECTIVE BOX
Day 1 of Anesthesia Pain Management Service    SUBJECTIVE: Doing ok  Pain Scale Score:          [X] Refer to charted pain scores    THERAPY:    s/p    150 mcg PF morphine on 2\7\2023       MEDICATIONS  (STANDING):  acetaminophen   IVPB .. 1000 milliGRAM(s) IV Intermittent every 6 hours  cefoTEtan  IVPB 2 Gram(s) IV Intermittent once  heparin   Injectable 5000 Unit(s) SubCutaneous every 8 hours  ketorolac   Injectable 15 milliGRAM(s) IV Push every 6 hours  lactated ringers. 1000 milliLiter(s) (40 mL/Hr) IV Continuous <Continuous>  magnesium oxide 1000 milliGRAM(s) Oral two times a day with meals  topiramate 25 milliGRAM(s) Oral two times a day    MEDICATIONS  (PRN):  oxyCODONE    IR 2.5 milliGRAM(s) Oral every 4 hours PRN Moderate Pain (4 - 6)  oxyCODONE    IR 5 milliGRAM(s) Oral every 4 hours PRN Severe Pain (7 - 10)      OBJECTIVE:    Sedation:        	[X] Alert	 [ ] Drowsy	[ ] Arousable      [ ] Asleep       [ ] Unresponsive    Side Effects:	[X] None 	[ ] Nausea	[ ] Vomiting         [ ] Pruritus  		[ ] Weakness            [ ] Numbness	          [ ] Other:    Vital Signs Last 24 Hrs  T(C): 36.8 (08 Feb 2023 09:04), Max: 36.8 (08 Feb 2023 09:04)  T(F): 98.2 (08 Feb 2023 09:04), Max: 98.2 (08 Feb 2023 09:04)  HR: 56 (08 Feb 2023 09:04) (47 - 99)  BP: 119/63 (08 Feb 2023 09:04) (92/51 - 129/65)  BP(mean): 72 (07 Feb 2023 21:30) (70 - 91)  RR: 18 (08 Feb 2023 09:04) (12 - 18)  SpO2: 100% (08 Feb 2023 09:04) (94% - 100%)    Parameters below as of 08 Feb 2023 09:04  Patient On (Oxygen Delivery Method): room air        ASSESSMENT/ PLAN  [X] Patient transitioned to prn analgesics  [X] Pain management per primary service, pain service to sign off   [X]Documentation and Verification of current medications

## 2023-02-08 NOTE — PROVIDER CONTACT NOTE (OTHER) - RECOMMENDATIONS
Notified MD Prince Rascon of pt's low BP and brown discharge from vagina.  Will continue to monitor if there is additional discharge from vagina. Stat labs sent.

## 2023-02-08 NOTE — PROGRESS NOTE ADULT - SUBJECTIVE AND OBJECTIVE BOX
SURGERY DAILY PROGRESS NOTE:       SUBJECTIVE/ROS: Patient seen and examined at bedside. Patient tolerating clears without n/v. Pain controlled Does report dark non bloody discharge, questionable vaginal vs via rectum. Stent removed at bedside.       MEDICATIONS  (STANDING):  acetaminophen   IVPB .. 1000 milliGRAM(s) IV Intermittent every 6 hours  cefoTEtan  IVPB 2 Gram(s) IV Intermittent once  heparin   Injectable 5000 Unit(s) SubCutaneous every 8 hours  ketorolac   Injectable 15 milliGRAM(s) IV Push every 6 hours  lactated ringers. 1000 milliLiter(s) (40 mL/Hr) IV Continuous <Continuous>  morphine PF Spinal 0.15 milliGRAM(s) IntraThecal. once  topiramate 25 milliGRAM(s) Oral two times a day    MEDICATIONS  (PRN):  naloxone Injectable 0.1 milliGRAM(s) IV Push every 3 minutes PRN For ANY of the following changes in patient status:  A. RR LESS THAN 10 breaths per minute, B. Oxygen saturation LESS THAN 90%, C. Sedation score of 6  ondansetron Injectable 4 milliGRAM(s) IV Push every 6 hours PRN Nausea  oxyCODONE    IR 2.5 milliGRAM(s) Oral every 4 hours PRN Moderate Pain (4 - 6)  oxyCODONE    IR 5 milliGRAM(s) Oral every 4 hours PRN Severe Pain (7 - 10)      OBJECTIVE:    Vital Signs Last 24 Hrs  T(C): 36.4 (08 Feb 2023 04:38), Max: 36.7 (07 Feb 2023 22:52)  T(F): 97.5 (08 Feb 2023 04:38), Max: 98.1 (07 Feb 2023 22:52)  HR: 56 (08 Feb 2023 05:00) (47 - 99)  BP: 96/56 (08 Feb 2023 05:00) (92/51 - 139/84)  BP(mean): 72 (07 Feb 2023 21:30) (70 - 91)  RR: 18 (08 Feb 2023 04:38) (12 - 18)  SpO2: 99% (08 Feb 2023 04:38) (94% - 100%)    Parameters below as of 08 Feb 2023 04:38  Patient On (Oxygen Delivery Method): nasal cannula  O2 Flow (L/min): 2        Physical Exam  Gen: NAD, A&Ox3  Pulm: No respiratory distress, no subcostal retractions  CV: RRR, no JVD  Abd: Soft, NT, ND, opsites c/d/i   Extremities:  FROM, warm and well perfused, equal bilateral muscle strength  : Rolle in place with blood tinge urine     I&O's Detail    07 Feb 2023 07:01  -  08 Feb 2023 07:00  --------------------------------------------------------  IN:    IV PiggyBack: 200 mL    Lactated Ringers: 680 mL    Oral Fluid: 240 mL  Total IN: 1120 mL    OUT:    Indwelling Catheter - Urethral (mL): 650 mL  Total OUT: 650 mL    Total NET: 470 mL          Daily Height in cm: 162.56 (07 Feb 2023 08:07)    Daily     LABS:                        10.8   8.52  )-----------( 288      ( 08 Feb 2023 03:21 )             33.7     02-08    138  |  103  |  12  ----------------------------<  139<H>  3.7   |  25  |  0.72    Ca    8.3<L>      08 Feb 2023 03:21  Phos  3.9     02-08  Mg     2.1     02-08            Assessment/Plan:  58y Female with diverticulitis s/p lap assisted anterior resection on 2/7     -DVT prophylaxis w SQH tid   -ERP   -monitor vitals given bradycardia   -reassess in few hours   -Analgesia and antiemetics as needed  -CLD --> LRD   -remove rolle at 10am       RED p [id number]

## 2023-02-08 NOTE — PROVIDER CONTACT NOTE (OTHER) - ASSESSMENT
BP 98/51. HR 61. Temp 97.3 F. RR 18. SpO2 99% on 2LO2NC.   Pt has rolle and ucath from OR. Brown discharge from vagina and urine is bloody from rolle & ucath. Pt denies any pain.

## 2023-02-09 ENCOUNTER — TRANSCRIPTION ENCOUNTER (OUTPATIENT)
Age: 58
End: 2023-02-09

## 2023-02-09 LAB
ANION GAP SERPL CALC-SCNC: 6 MMOL/L — SIGNIFICANT CHANGE UP (ref 5–17)
BUN SERPL-MCNC: 9 MG/DL — SIGNIFICANT CHANGE UP (ref 7–23)
CALCIUM SERPL-MCNC: 9.1 MG/DL — SIGNIFICANT CHANGE UP (ref 8.4–10.5)
CHLORIDE SERPL-SCNC: 106 MMOL/L — SIGNIFICANT CHANGE UP (ref 96–108)
CO2 SERPL-SCNC: 29 MMOL/L — SIGNIFICANT CHANGE UP (ref 22–31)
CREAT SERPL-MCNC: 0.75 MG/DL — SIGNIFICANT CHANGE UP (ref 0.5–1.3)
EGFR: 93 ML/MIN/1.73M2 — SIGNIFICANT CHANGE UP
GLUCOSE SERPL-MCNC: 97 MG/DL — SIGNIFICANT CHANGE UP (ref 70–99)
HCT VFR BLD CALC: 37.8 % — SIGNIFICANT CHANGE UP (ref 34.5–45)
HGB BLD-MCNC: 11.8 G/DL — SIGNIFICANT CHANGE UP (ref 11.5–15.5)
MAGNESIUM SERPL-MCNC: 2.1 MG/DL — SIGNIFICANT CHANGE UP (ref 1.6–2.6)
MCHC RBC-ENTMCNC: 27.1 PG — SIGNIFICANT CHANGE UP (ref 27–34)
MCHC RBC-ENTMCNC: 31.2 GM/DL — LOW (ref 32–36)
MCV RBC AUTO: 86.7 FL — SIGNIFICANT CHANGE UP (ref 80–100)
NRBC # BLD: 0 /100 WBCS — SIGNIFICANT CHANGE UP (ref 0–0)
PHOSPHATE SERPL-MCNC: 2.4 MG/DL — LOW (ref 2.5–4.5)
PLATELET # BLD AUTO: 278 K/UL — SIGNIFICANT CHANGE UP (ref 150–400)
POTASSIUM SERPL-MCNC: 3.7 MMOL/L — SIGNIFICANT CHANGE UP (ref 3.5–5.3)
POTASSIUM SERPL-SCNC: 3.7 MMOL/L — SIGNIFICANT CHANGE UP (ref 3.5–5.3)
RBC # BLD: 4.36 M/UL — SIGNIFICANT CHANGE UP (ref 3.8–5.2)
RBC # FLD: 14.8 % — HIGH (ref 10.3–14.5)
SODIUM SERPL-SCNC: 141 MMOL/L — SIGNIFICANT CHANGE UP (ref 135–145)
WBC # BLD: 8.17 K/UL — SIGNIFICANT CHANGE UP (ref 3.8–10.5)
WBC # FLD AUTO: 8.17 K/UL — SIGNIFICANT CHANGE UP (ref 3.8–10.5)

## 2023-02-09 RX ORDER — SODIUM,POTASSIUM PHOSPHATES 278-250MG
1 POWDER IN PACKET (EA) ORAL ONCE
Refills: 0 | Status: COMPLETED | OUTPATIENT
Start: 2023-02-09 | End: 2023-02-09

## 2023-02-09 RX ORDER — IBUPROFEN 200 MG
1 TABLET ORAL
Qty: 0 | Refills: 0 | DISCHARGE
Start: 2023-02-09

## 2023-02-09 RX ORDER — MELOXICAM 15 MG/1
1 TABLET ORAL
Qty: 0 | Refills: 0 | DISCHARGE

## 2023-02-09 RX ORDER — ACETAMINOPHEN 500 MG
2 TABLET ORAL
Qty: 0 | Refills: 0 | DISCHARGE
Start: 2023-02-09

## 2023-02-09 RX ORDER — OXYCODONE HYDROCHLORIDE 5 MG/1
1 TABLET ORAL
Qty: 3 | Refills: 0
Start: 2023-02-09

## 2023-02-09 RX ADMIN — Medication 1000 MILLIGRAM(S): at 09:04

## 2023-02-09 RX ADMIN — Medication 400 MILLIGRAM(S): at 05:37

## 2023-02-09 RX ADMIN — MAGNESIUM OXIDE 400 MG ORAL TABLET 1000 MILLIGRAM(S): 241.3 TABLET ORAL at 09:04

## 2023-02-09 RX ADMIN — HEPARIN SODIUM 5000 UNIT(S): 5000 INJECTION INTRAVENOUS; SUBCUTANEOUS at 14:09

## 2023-02-09 RX ADMIN — HEPARIN SODIUM 5000 UNIT(S): 5000 INJECTION INTRAVENOUS; SUBCUTANEOUS at 23:41

## 2023-02-09 RX ADMIN — Medication 1000 MILLIGRAM(S): at 09:34

## 2023-02-09 RX ADMIN — Medication 1 PACKET(S): at 09:04

## 2023-02-09 RX ADMIN — Medication 400 MILLIGRAM(S): at 17:46

## 2023-02-09 RX ADMIN — Medication 400 MILLIGRAM(S): at 23:41

## 2023-02-09 RX ADMIN — Medication 1000 MILLIGRAM(S): at 02:16

## 2023-02-09 RX ADMIN — Medication 25 MILLIGRAM(S): at 04:40

## 2023-02-09 RX ADMIN — HEPARIN SODIUM 5000 UNIT(S): 5000 INJECTION INTRAVENOUS; SUBCUTANEOUS at 04:40

## 2023-02-09 RX ADMIN — Medication 1000 MILLIGRAM(S): at 21:00

## 2023-02-09 RX ADMIN — Medication 400 MILLIGRAM(S): at 11:56

## 2023-02-09 RX ADMIN — Medication 400 MILLIGRAM(S): at 04:40

## 2023-02-09 RX ADMIN — Medication 1000 MILLIGRAM(S): at 22:32

## 2023-02-09 RX ADMIN — Medication 1000 MILLIGRAM(S): at 02:15

## 2023-02-09 RX ADMIN — Medication 25 MILLIGRAM(S): at 21:00

## 2023-02-09 RX ADMIN — Medication 1000 MILLIGRAM(S): at 14:08

## 2023-02-09 RX ADMIN — Medication 400 MILLIGRAM(S): at 00:03

## 2023-02-09 NOTE — DISCHARGE NOTE PROVIDER - NSDCCPCAREPLAN_GEN_ALL_CORE_FT
PRINCIPAL DISCHARGE DIAGNOSIS  Diagnosis: Diverticulitis  Assessment and Plan of Treatment: WOUND CARE: Staples will be removed at follow up office visit.  Cover incision with dry gauze and paper tape, change daily or as needed.  BATHING: Please do not submerge wound underwater. You may shower and/or sponge bathe.  ACTIVITY: No heavy lifting anything more than 10-15lbs or straining. Otherwise, you may return to your usual level of physical activity. If you are taking narcotic pain medication (such as Percocet), do NOT drive a car, operate machinery or make important decisions.  DIET: Low fiber diet  PAIN: A prescription for oxycodone has been sent to the pharmacy. You should only take these for severe pain. For mild or moderate pain, you may take 975mg of tylenol every 6 hours. Do not exceed more than 4G per day. You may also take ibuprofen 400mg every 6 hours. It is most effective to alternate these two medicines with each other, 3 hours apart.   NOTIFY YOUR SURGEON IF: You have any bleeding that does not stop, any pus draining from your wound, any fever (over 100.4 F) or chills, persistent nausea/vomiting with inability to tolerate food or liquids, persistent diarrhea, or if your pain is not controlled on your discharge pain medications.  FOLLOW-UP:  1. Please call to make a follow-up appointment within one week of discharge with Dr. Abraham.  2. Please follow up with your primary care physician in one week regarding your hospitalization.

## 2023-02-09 NOTE — DISCHARGE NOTE PROVIDER - HOSPITAL COURSE
59 yo female presents to PST prior to scheduled ERAS Laparoscopic low anterior resection on 2/7/23 with Dr. Richard Abraham. Pmhx includes diverticulosis and migraines. Hospitalized at [facility] for perforated diverticulum 11/10-11/13/2022. Patient endorses "twinging" in LLQ with certain movements (bending forward) and unintentional weight loss of 15 lbs since hospitalization in November 2022. Denies fevers, chills, chest pain, palpitations, SOB/HAND, dizziness, syncope, N/V/diarrhea. Recently evaluated by Dr. Abraham and above surgery was recommended, now s/p lap assisted anterior resection 2/7. Patient tolerated procedure well, was extubated and sent to pacu stable. POD1 rolle dc and passed TOV.  POD2 diet adv to LRD and tolerated. Caprini score < 6, no dvt ppx on dc.  At the time of discharge, the patient was hemodynamically stable, was tolerating PO diet, was voiding urine and passing stool, was ambulating, and was comfortable with adequate pain control. The patient was instructed to follow up with Dr. Abraham within 1-2 weeks after discharge from the hospital. The patient felt comfortable with discharge. The patient was discharged to home. The patient had no other issues.

## 2023-02-09 NOTE — DISCHARGE NOTE PROVIDER - NSDCMRMEDTOKEN_GEN_ALL_CORE_FT
Fioricet oral capsule: 1 cap(s) orally every 4 hours, As Needed  Mobic 7.5 mg oral tablet: 1 tab(s) orally once a day, As Needed  Topamax 25 mg oral tablet: 1 tab(s) orally 2 times a day   acetaminophen 500 mg oral tablet: 2 tab(s) orally every 6 hours  Fioricet oral capsule: 1 cap(s) orally every 4 hours, As Needed  ibuprofen 400 mg oral tablet: 1 tab(s) orally every 6 hours  oxyCODONE 5 mg oral tablet: 1 tab(s) orally every 6 hours, As Needed -Severe Pain (7 - 10) MDD:4  Topamax 25 mg oral tablet: 1 tab(s) orally 2 times a day

## 2023-02-09 NOTE — DISCHARGE NOTE PROVIDER - CARE PROVIDER_API CALL
Richard Abraham)  ColonRectal Surgery; Surgery  [address], [address]  [place] [address]  Phone: [phone]  Fax: [phone]  Follow Up Time: 1 week

## 2023-02-09 NOTE — PROGRESS NOTE ADULT - SUBJECTIVE AND OBJECTIVE BOX
Surgery Progress Note    Overnight Events: No acute events overnight.    SUBJECTIVE: Patient seen and examined at bedside with surgical team, patient without complaints. Denies fever, chills, CP, SOB nausea, vomiting, dizziness.      OBJECTIVE:    Vital Signs Last 24 Hrs  T(C): 36.9 (09 Feb 2023 04:22), Max: 37.1 (08 Feb 2023 16:44)  T(F): 98.4 (09 Feb 2023 04:22), Max: 98.8 (08 Feb 2023 16:44)  HR: 56 (09 Feb 2023 04:22) (56 - 72)  BP: 124/75 (09 Feb 2023 04:22) (95/59 - 124/75)  BP(mean): --  RR: 18 (09 Feb 2023 04:22) (18 - 18)  SpO2: 96% (09 Feb 2023 04:22) (96% - 100%)    Parameters below as of 09 Feb 2023 04:22  Patient On (Oxygen Delivery Method): room air    I&O's Detail    08 Feb 2023 07:01  -  09 Feb 2023 07:00  --------------------------------------------------------  IN:    IV PiggyBack: 100 mL    Lactated Ringers: 2040 mL    Oral Fluid: 1220 mL  Total IN: 3360 mL    OUT:    Blood Loss (mL): 0 mL    Indwelling Catheter - Urethral (mL): 700 mL    Voided (mL): 1800 mL  Total OUT: 2500 mL    Total NET: 860 mL      MEDICATIONS  (STANDING):  acetaminophen     Tablet .. 1000 milliGRAM(s) Oral every 6 hours  cefoTEtan  IVPB 2 Gram(s) IV Intermittent once  heparin   Injectable 5000 Unit(s) SubCutaneous every 8 hours  ibuprofen  Tablet. 400 milliGRAM(s) Oral every 6 hours  magnesium oxide 1000 milliGRAM(s) Oral two times a day with meals  topiramate 25 milliGRAM(s) Oral two times a day    MEDICATIONS  (PRN):  oxyCODONE    IR 2.5 milliGRAM(s) Oral every 4 hours PRN Moderate Pain (4 - 6)  oxyCODONE    IR 5 milliGRAM(s) Oral every 4 hours PRN Severe Pain (7 - 10)      PHYSICAL EXAM:  Constitutional: A&Ox3, NAD  Respiratory: Unlabored breathing  Abdomen: Soft, nondistended, appropriately TTP. No rebound or guarding. incisions c/d/i  Extremities: WWP, RICE spontaneously    LABS:                        11.8   8.17  )-----------( 278      ( 09 Feb 2023 07:21 )             37.8     02-09    141  |  106  |  9   ----------------------------<  97  3.7   |  29  |  0.75    Ca    9.1      09 Feb 2023 07:23  Phos  2.4     02-09  Mg     2.1     02-09              IMAGING:

## 2023-02-10 ENCOUNTER — TRANSCRIPTION ENCOUNTER (OUTPATIENT)
Age: 58
End: 2023-02-10

## 2023-02-10 VITALS — DIASTOLIC BLOOD PRESSURE: 70 MMHG | TEMPERATURE: 98 F | SYSTOLIC BLOOD PRESSURE: 103 MMHG

## 2023-02-10 PROCEDURE — 85027 COMPLETE CBC AUTOMATED: CPT

## 2023-02-10 PROCEDURE — C1758: CPT

## 2023-02-10 PROCEDURE — 83735 ASSAY OF MAGNESIUM: CPT

## 2023-02-10 PROCEDURE — 82962 GLUCOSE BLOOD TEST: CPT

## 2023-02-10 PROCEDURE — 84100 ASSAY OF PHOSPHORUS: CPT

## 2023-02-10 PROCEDURE — C1889: CPT

## 2023-02-10 PROCEDURE — C9399: CPT

## 2023-02-10 PROCEDURE — 80048 BASIC METABOLIC PNL TOTAL CA: CPT

## 2023-02-10 PROCEDURE — 36415 COLL VENOUS BLD VENIPUNCTURE: CPT

## 2023-02-10 PROCEDURE — 88307 TISSUE EXAM BY PATHOLOGIST: CPT

## 2023-02-10 RX ADMIN — Medication 1000 MILLIGRAM(S): at 03:59

## 2023-02-10 RX ADMIN — Medication 400 MILLIGRAM(S): at 01:00

## 2023-02-10 RX ADMIN — Medication 1000 MILLIGRAM(S): at 04:00

## 2023-02-10 RX ADMIN — Medication 25 MILLIGRAM(S): at 06:31

## 2023-02-10 RX ADMIN — Medication 1000 MILLIGRAM(S): at 11:13

## 2023-02-10 RX ADMIN — Medication 400 MILLIGRAM(S): at 06:46

## 2023-02-10 RX ADMIN — HEPARIN SODIUM 5000 UNIT(S): 5000 INJECTION INTRAVENOUS; SUBCUTANEOUS at 06:31

## 2023-02-10 RX ADMIN — Medication 400 MILLIGRAM(S): at 06:31

## 2023-02-10 NOTE — PROGRESS NOTE ADULT - ASSESSMENT
58y Female with diverticulitis s/p lap assisted anterior resection on 2/7     - DVT prophylaxis w SQH tid   - ERP   - monitor vitals given bradycardia   - reassess in few hours   - Analgesia and antiemetics as needed  - LRD   - Passed TOV  - Possible d/c later today      RED p 0170
58y Female with diverticulitis s/p lap assisted anterior resection on 2/7. ERP protocol, tolerating diet today.    - DVT prophylaxis w SQH tid   - ERP   - LRD   - Passed TOV 2/8  - Plan for d/c today    RED p 1261
Day 1 of Anesthesia Pain Management Service    SUBJECTIVE:  Pain Scale Score:          [X] Refer to charted pain scores    THERAPY: Received PF spinal morphine as above    OBJECTIVE:    Sedation:        	[X] Alert	[ ] Drowsy	[ ] Arousable      [ ] Asleep       [ ] Unresponsive    Side Effects:	[X] None	[ ] Nausea	[ ] Vomiting         [ ] Pruritus  		[ ] Weakness            [ ] Numbness	          [ ] Other:    ASSESSMENT/ PLAN  [X] Patient transitioned to prn analgesics  [X] Pain management per primary service, pain service to sign off   [X]Documentation and Verification of current medications

## 2023-02-10 NOTE — PROGRESS NOTE ADULT - SUBJECTIVE AND OBJECTIVE BOX
Surgery Progress Note    Overnight Events: No acute events overnight.    SUBJECTIVE: Patient seen and examined at bedside with surgical team, patient without complaints. Denies fever, chills, CP, SOB nausea, vomiting, dizziness. Reports tolerating dinner well.       PHYSICAL EXAM:  Constitutional: A&Ox3, NAD  Respiratory: Unlabored breathing  Abdomen: Soft, nondistended, appropriately TTP. No rebound or guarding. incisions c/d/i  Extremities: WWP, RICE spontaneously    OBJECTIVE  T(C): 37 (02-10-23 @ 04:37), Max: 37.2 (02-09-23 @ 16:28)  HR: 63 (02-10-23 @ 04:37) (54 - 81)  BP: 106/69 (02-10-23 @ 04:37) (102/66 - 119/78)  RR: 18 (02-10-23 @ 04:37) (18 - 18)  SpO2: 97% (02-10-23 @ 04:37) (95% - 98%)  I&O's Summary    09 Feb 2023 07:01  -  10 Feb 2023 07:00  --------------------------------------------------------  IN: 1221 mL / OUT: 1100 mL / NET: 121 mL      I&O's Detail    09 Feb 2023 07:01  -  10 Feb 2023 07:00  --------------------------------------------------------  IN:    Oral Fluid: 1221 mL  Total IN: 1221 mL    OUT:    Voided (mL): 1100 mL  Total OUT: 1100 mL    Total NET: 121 mL        LABS                        11.8   8.17  )-----------( 278      ( 09 Feb 2023 07:21 )             37.8     02-09    141  |  106  |  9   ----------------------------<  97  3.7   |  29  |  0.75    Ca    9.1      09 Feb 2023 07:23  Phos  2.4     02-09  Mg     2.1     02-09          ORDERS/MEDICATIONS  MEDICATIONS  (STANDING):  acetaminophen     Tablet .. 1000 milliGRAM(s) Oral every 6 hours  cefoTEtan  IVPB 2 Gram(s) IV Intermittent once  heparin   Injectable 5000 Unit(s) SubCutaneous every 8 hours  ibuprofen  Tablet. 400 milliGRAM(s) Oral every 6 hours  magnesium oxide 1000 milliGRAM(s) Oral two times a day with meals  topiramate 25 milliGRAM(s) Oral two times a day    MEDICATIONS  (PRN):  oxyCODONE    IR 2.5 milliGRAM(s) Oral every 4 hours PRN Moderate Pain (4 - 6)  oxyCODONE    IR 5 milliGRAM(s) Oral every 4 hours PRN Severe Pain (7 - 10)

## 2023-02-10 NOTE — DISCHARGE NOTE NURSING/CASE MANAGEMENT/SOCIAL WORK - NSDCPEFALRISK_GEN_ALL_CORE
For information on Fall & Injury Prevention, visit: [email or web address] OR  [email or web address] OR  https://www.cdc.gov/steadi/patient.html

## 2023-02-10 NOTE — DISCHARGE NOTE NURSING/CASE MANAGEMENT/SOCIAL WORK - PATIENT PORTAL LINK FT
You can access the FollowMyHealth Patient Portal offered by [facility] by registering at the following website: [email or web address]. By joining [facility]’s FollowMyHealth portal, you will also be able to view your health information using other applications (apps) compatible with our system.

## 2023-02-11 ENCOUNTER — NON-APPOINTMENT (OUTPATIENT)
Age: 58
End: 2023-02-11

## 2023-02-13 ENCOUNTER — NON-APPOINTMENT (OUTPATIENT)
Age: 58
End: 2023-02-13

## 2023-02-13 PROBLEM — S36.113A LACERATION OF LIVER, UNSPECIFIED DEGREE, INITIAL ENCOUNTER: Chronic | Status: ACTIVE | Noted: 2023-01-25

## 2023-02-13 PROBLEM — Z87.19 PERSONAL HISTORY OF OTHER DISEASES OF THE DIGESTIVE SYSTEM: Chronic | Status: ACTIVE | Noted: 2023-01-25
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
## 2023-02-13 PROBLEM — R01.1 CARDIAC MURMUR, UNSPECIFIED: Chronic | Status: ACTIVE | Noted: 2023-01-25

## 2023-02-13 PROBLEM — Z92.89 PERSONAL HISTORY OF OTHER MEDICAL TREATMENT: Chronic | Status: ACTIVE | Noted: 2023-01-25

## 2023-02-16 LAB — SURGICAL PATHOLOGY STUDY: SIGNIFICANT CHANGE UP

## 2023-02-17 ENCOUNTER — APPOINTMENT (OUTPATIENT)
Dept: COLORECTAL SURGERY | Facility: CLINIC | Age: 58
End: 2023-02-17
Payer: COMMERCIAL

## 2023-02-17 PROCEDURE — 99024 POSTOP FOLLOW-UP VISIT: CPT

## 2023-02-20 NOTE — HISTORY OF PRESENT ILLNESS
[FreeTextEntry1] : Very pleasant 58-year-old female who presents for her first postoperative visit.\par \par Patient is approximately 10 days status post laparoscopic anterior resection for chronic diverticulitis.  She had a rapid and uneventful postoperative recuperation and was discharged home on postoperative day #2 or 3.  She looks and feels well having minimal incisional discomfort.  She is tolerating diet and moving her bowels.  She has been experiencing some abdominal bloating later in the day.  She denies nausea, vomiting, rectal bleeding or temperature elevation.\par \par Final pathology is pending.\par \par Physical examination reveals a soft, nontender, nondistended abdomen.  Her trocar site and specimen extraction site are healing nicely with no evidence of infection.  Surgical staples were removed.  Steri-Strips were placed.\par \par Patient was assured that she is making an excellent recuperation.  I want her to remain on a low residue diet and avoid strenuous activity.  I will see her in 6 weeks for sigmoidoscopy under sedation.

## 2023-03-20 ENCOUNTER — APPOINTMENT (OUTPATIENT)
Dept: COLORECTAL SURGERY | Facility: CLINIC | Age: 58
End: 2023-03-20
Payer: COMMERCIAL

## 2023-03-20 PROCEDURE — 45330 DIAGNOSTIC SIGMOIDOSCOPY: CPT | Mod: 58

## 2023-09-20 ENCOUNTER — NON-APPOINTMENT (OUTPATIENT)
Age: 58
End: 2023-09-20

## 2023-09-20 ENCOUNTER — APPOINTMENT (OUTPATIENT)
Dept: CARDIOLOGY | Facility: CLINIC | Age: 58
End: 2023-09-20
Payer: COMMERCIAL

## 2023-09-20 VITALS
SYSTOLIC BLOOD PRESSURE: 132 MMHG | BODY MASS INDEX: 28.68 KG/M2 | HEIGHT: 64 IN | OXYGEN SATURATION: 99 % | DIASTOLIC BLOOD PRESSURE: 90 MMHG | WEIGHT: 168 LBS | HEART RATE: 64 BPM

## 2023-09-20 PROCEDURE — 99214 OFFICE O/P EST MOD 30 MIN: CPT | Mod: 25

## 2023-09-20 PROCEDURE — 93000 ELECTROCARDIOGRAM COMPLETE: CPT

## 2023-09-20 RX ORDER — TOPIRAMATE 50 MG/1
50 TABLET, COATED ORAL EVERY MORNING
Refills: 0 | Status: ACTIVE | COMMUNITY

## 2023-09-20 RX ORDER — WHEAT DEXTRIN 3 G/4 G
POWDER (GRAM) ORAL DAILY
Refills: 0 | Status: ACTIVE | COMMUNITY

## 2023-09-30 NOTE — ED ADULT NURSE NOTE - PAIN RATING/NUMBER SCALE (0-10): ACTIVITY
80
Spoke with the patient's daughter and informed her that any other refills for the pain medication they will have to get it from her PCP, daughter verbally understood and said thanks.  
5

## 2024-03-18 ENCOUNTER — APPOINTMENT (OUTPATIENT)
Dept: CARDIOLOGY | Facility: CLINIC | Age: 59
End: 2024-03-18
Payer: COMMERCIAL

## 2024-03-18 ENCOUNTER — NON-APPOINTMENT (OUTPATIENT)
Age: 59
End: 2024-03-18

## 2024-03-18 VITALS
TEMPERATURE: 97 F | BODY MASS INDEX: 30.39 KG/M2 | HEART RATE: 72 BPM | SYSTOLIC BLOOD PRESSURE: 124 MMHG | HEIGHT: 64 IN | DIASTOLIC BLOOD PRESSURE: 80 MMHG | WEIGHT: 178 LBS | OXYGEN SATURATION: 99 % | RESPIRATION RATE: 14 BRPM

## 2024-03-18 VITALS
SYSTOLIC BLOOD PRESSURE: 124 MMHG | OXYGEN SATURATION: 99 % | DIASTOLIC BLOOD PRESSURE: 80 MMHG | WEIGHT: 178 LBS | BODY MASS INDEX: 30.55 KG/M2 | HEART RATE: 72 BPM

## 2024-03-18 DIAGNOSIS — I34.0 NONRHEUMATIC MITRAL (VALVE) INSUFFICIENCY: ICD-10-CM

## 2024-03-18 DIAGNOSIS — I49.1 ATRIAL PREMATURE DEPOLARIZATION: ICD-10-CM

## 2024-03-18 DIAGNOSIS — E78.5 HYPERLIPIDEMIA, UNSPECIFIED: ICD-10-CM

## 2024-03-18 DIAGNOSIS — R00.2 PALPITATIONS: ICD-10-CM

## 2024-03-18 DIAGNOSIS — R01.1 CARDIAC MURMUR, UNSPECIFIED: ICD-10-CM

## 2024-03-18 PROCEDURE — G2211 COMPLEX E/M VISIT ADD ON: CPT

## 2024-03-18 PROCEDURE — 99214 OFFICE O/P EST MOD 30 MIN: CPT

## 2024-03-18 PROCEDURE — 93000 ELECTROCARDIOGRAM COMPLETE: CPT

## 2024-03-18 NOTE — PHYSICAL EXAM
[Well Developed] : well developed [Well Nourished] : well nourished [No Acute Distress] : no acute distress [Normal Conjunctiva] : normal conjunctiva [Normal Venous Pressure] : normal venous pressure [Normal Rate] : normal [No Carotid Bruit] : no carotid bruit [Normal S2] : normal S2 [Normal S1] : normal S1 [S3] : no S3 [S4] : no S4 [I] : a grade 1 [No Pitting Edema] : no pitting edema present [Left Carotid Bruit] : no bruit heard over the left carotid [Right Carotid Bruit] : no bruit heard over the right carotid [Right Femoral Bruit] : no bruit heard over the right femoral artery [Left Femoral Bruit] : no bruit heard over the left femoral artery [2+] : left 2+ [No Abnormalities] : the abdominal aorta was not enlarged and no bruit was heard [Clear Lung Fields] : clear lung fields [Good Air Entry] : good air entry [No Respiratory Distress] : no respiratory distress  [Soft] : abdomen soft [Non Tender] : non-tender [Normal Bowel Sounds] : normal bowel sounds [Normal Gait] : normal gait [No Edema] : no edema [No Cyanosis] : no cyanosis [No Clubbing] : no clubbing [No Varicosities] : no varicosities [No Skin Lesions] : no skin lesions [No Rash] : no rash [Moves all extremities] : moves all extremities [Normal Speech] : normal speech [No Focal Deficits] : no focal deficits [Alert and Oriented] : alert and oriented [Normal memory] : normal memory

## 2024-03-18 NOTE — DISCUSSION/SUMMARY
[FreeTextEntry1] : Palpitations : intermittent , rare , symptomatic PACS , advised to decrease coffee intake , nasal decongestant ,   MR: murmur   normal ventricular function , mild MR   Minimal elevated LDL normal Cholesterol  encourage diet restriction , exercise   Migraine/Cluster HA's:  Currently controlled is followed with Neurology   IBS/diverticulosis ,   as per  GI recommendation   Complete labs with PCP Dr Huynh   follow up after 6 months      [EKG obtained to assist in diagnosis and management of assessed problem(s)] : EKG obtained to assist in diagnosis and management of assessed problem(s)

## 2024-03-18 NOTE — HISTORY OF PRESENT ILLNESS
[FreeTextEntry1] : Patient with hx of Arrhythmia ( APC, PVC,s SVT ), MR, Migraine followed with Neurology, Uterine polyps S/P D&C 7/2019 menopause since october 19 patient came for follow up says she is doing ok cardiac wise currently , however patient did have increased episodes of palpitations when she takes for sinus headaches , patient does drink coffee  , on and off , recently got better , , patient denies any chest pain or shortness of breath or dizziness     patient did have  partial colectomy  in feb 2023 ,not eating that much     echo showed normal ventricular function , mild MR   patient does have chronic headaches ,  her blood work showed elevated LDL   normal TC

## 2024-03-18 NOTE — CARDIOLOGY SUMMARY
[de-identified] : 3/18/24  Normal sinus rhythm low Qrs voltage  [de-identified] : 9/1/20 10 METS 86%MPHR Rare PVCS  [de-identified] : 9/12/22  EF 65% Mild MR

## 2024-08-15 NOTE — PRE-ANESTHESIA EVALUATION ADULT - NSANTHAPLANRD_GEN_ALL_CORE
Instructions: This plan will send the code FBSE to the PM system.  DO NOT or CHANGE the price. Detail Level: Generalized Price (Do Not Change): 0.00 general/regional

## 2024-09-16 ENCOUNTER — APPOINTMENT (OUTPATIENT)
Dept: CARDIOLOGY | Facility: CLINIC | Age: 59
End: 2024-09-16
Payer: COMMERCIAL

## 2024-09-16 ENCOUNTER — NON-APPOINTMENT (OUTPATIENT)
Age: 59
End: 2024-09-16

## 2024-09-16 VITALS
HEART RATE: 69 BPM | OXYGEN SATURATION: 97 % | WEIGHT: 182 LBS | DIASTOLIC BLOOD PRESSURE: 76 MMHG | SYSTOLIC BLOOD PRESSURE: 134 MMHG | BODY MASS INDEX: 31.07 KG/M2 | HEIGHT: 64 IN

## 2024-09-16 DIAGNOSIS — I49.1 ATRIAL PREMATURE DEPOLARIZATION: ICD-10-CM

## 2024-09-16 DIAGNOSIS — R01.1 CARDIAC MURMUR, UNSPECIFIED: ICD-10-CM

## 2024-09-16 DIAGNOSIS — E78.5 HYPERLIPIDEMIA, UNSPECIFIED: ICD-10-CM

## 2024-09-16 DIAGNOSIS — R00.2 PALPITATIONS: ICD-10-CM

## 2024-09-16 DIAGNOSIS — R03.0 ELEVATED BLOOD-PRESSURE READING, W/OUT DIAGNOSIS OF HYPERTENSION: ICD-10-CM

## 2024-09-16 PROCEDURE — 99214 OFFICE O/P EST MOD 30 MIN: CPT | Mod: 25

## 2024-09-16 PROCEDURE — G2211 COMPLEX E/M VISIT ADD ON: CPT | Mod: NC

## 2024-09-16 PROCEDURE — 93000 ELECTROCARDIOGRAM COMPLETE: CPT

## 2024-09-16 RX ORDER — ATORVASTATIN CALCIUM 20 MG/1
20 TABLET, FILM COATED ORAL
Qty: 1 | Refills: 1 | Status: ACTIVE | COMMUNITY
Start: 2024-09-16 | End: 1900-01-01

## 2024-09-16 NOTE — HISTORY OF PRESENT ILLNESS
[FreeTextEntry1] : Patient with hx of Arrhythmia ( APC, PVC,s SVT ), MR, Migraine followed with Neurology, Uterine polyps S/P D&C 7/2019 menopause since october 19 patient came for follow up says she is stressed due to her father's health ,other wise she  feeling fine cardiac wise currently , however patient did have  improved with discontinuation of decongestant tablet ,patient denies any chest pain or shortness of breath or dizziness. patient blood pressure is minimally elevated as she had salty food ,   patient did have  partial colectomy  in feb 2023    echo showed normal ventricular function, mild MR   patient does have chronic headaches ,  her blood work showed elevated       normal TC

## 2024-09-16 NOTE — DISCUSSION/SUMMARY
[FreeTextEntry1] : Palpitations : intermittent , rare , symptomatic PACS , advised to decrease coffee intake , nasal decongestant ,   MR: murmur   normal ventricular function , mild MR    elevated LDL elevated  Cholesterol : encourage diet restriction , exercise  consider  adding atorvastatin 10 mg po daily   Elevated blood pressure : encourage low salt diet , home BP   Migraine/Cluster HA's:  Currently controlled is followed with Neurology   IBS/diverticulosis ,   as per  GI recommendation     follow up after 6 months      [EKG obtained to assist in diagnosis and management of assessed problem(s)] : EKG obtained to assist in diagnosis and management of assessed problem(s) no

## 2024-09-16 NOTE — CARDIOLOGY SUMMARY
[de-identified] : 9/16/24  Normal sinus rhythm low Qrs voltage  [de-identified] : 9/1/20 10 METS 86%MPHR Rare PVCS  [de-identified] : 9/12/22  EF 65% Mild MR

## 2025-03-17 ENCOUNTER — APPOINTMENT (OUTPATIENT)
Dept: CARDIOLOGY | Facility: CLINIC | Age: 60
End: 2025-03-17

## 2025-04-14 ENCOUNTER — NON-APPOINTMENT (OUTPATIENT)
Age: 60
End: 2025-04-14

## 2025-04-14 NOTE — PRE-OP CHECKLIST - TYPE OF SOLUTION
What Type Of Note Output Would You Prefer (Optional)?: Standard Output
What Is The Reason For Today's Visit?: Full Body Skin Examination
What Is The Reason For Today's Visit? (Being Monitored For X): the development of new lesions
ivl

## (undated) DEVICE — FOLEY CATH 2-WAY 28FR 30CC SILICONE

## (undated) DEVICE — D HELP - CLEARVIEW CLEARIFY SYSTEM

## (undated) DEVICE — SUT CHROMIC 0 36" GS-21

## (undated) DEVICE — Device

## (undated) DEVICE — WARMING BLANKET UPPER ADULT

## (undated) DEVICE — SUT CHROMIC 3-0 30" V-20

## (undated) DEVICE — VISTASEAL DUAL APPLICATOR

## (undated) DEVICE — BLADE SCALPEL SAFETYLOCK #15

## (undated) DEVICE — TUBING INSUFFLATION LAP FILTER 10FT

## (undated) DEVICE — FOLEY TRAY 16FR 5CC LTX UMETER CLOSED

## (undated) DEVICE — GLV 7 PROTEXIS (WHITE)

## (undated) DEVICE — DRAPE 1/2 SHEET 40X57"

## (undated) DEVICE — WARMING BLANKET FULL ADULT

## (undated) DEVICE — DRSG OPSITE 13.75 X 4"

## (undated) DEVICE — DRAPE LEGGINGS XL

## (undated) DEVICE — VALVE YELLOW PORT SEAL PLUS 5MM

## (undated) DEVICE — VENODYNE/SCD SLEEVE CALF MEDIUM

## (undated) DEVICE — TROCAR APPLIED MEDICAL KII BALLOON BLUNT TIP 12MM X 100MM

## (undated) DEVICE — ADAPTER URETHRAL CATH CONNECTING

## (undated) DEVICE — OSTOMY KIT 2-PIECE 2.25" NS (RED)

## (undated) DEVICE — ELCTR BOVIE PENCIL SMOKE EVACUATION

## (undated) DEVICE — ACMI SELF-SEALING SEAL UP TO 7FR

## (undated) DEVICE — TUBING RANGER FLUID IRRIGATION SET DISP

## (undated) DEVICE — GELPORT LAPAROSCOPIC SYSTEM

## (undated) DEVICE — GOWN TRIMAX LG

## (undated) DEVICE — SOL IRR BAG H2O 3000ML

## (undated) DEVICE — TUBING STRYKEFLOW II SUCTION / IRRIGATOR

## (undated) DEVICE — GLV 8 PROTEXIS (WHITE)

## (undated) DEVICE — GLV 6.5 PROTEXIS (WHITE)

## (undated) DEVICE — SYR LUER LOK 30CC

## (undated) DEVICE — SUT CHROMIC 1 30" GS-21

## (undated) DEVICE — PACK COLON BUNDLE

## (undated) DEVICE — SUT POLYSORB 0 36" GU-46

## (undated) DEVICE — SOL IRR POUR NS 0.9% 500ML

## (undated) DEVICE — GLV 8.5 PROTEXIS (WHITE)

## (undated) DEVICE — TAPE SILK 3"

## (undated) DEVICE — SOL IRR POUR H2O 250ML

## (undated) DEVICE — DRAPE TOWEL BLUE 17" X 24"

## (undated) DEVICE — PACK MAJOR ABDOMINAL SUPINE

## (undated) DEVICE — LIGASURE IMPACT

## (undated) DEVICE — VENODYNE/SCD SLEEVE CALF LARGE

## (undated) DEVICE — LIGASURE BLUNT TIP 37CM

## (undated) DEVICE — DRSG OPSITE 2.5 X 2"

## (undated) DEVICE — DRAPE GENERAL ENDOSCOPY

## (undated) DEVICE — SPECIMEN CONTAINER 100ML

## (undated) DEVICE — DRSG TELFA 3 X 8

## (undated) DEVICE — SUT POLYSORB 3-0 30" V-20 UNDYED

## (undated) DEVICE — DRSG TAPE UMBILICAL COTTON 2" X 30 X 1/8"

## (undated) DEVICE — SUT SURGIPRO 0 30" GS-22

## (undated) DEVICE — PACK CYSTO

## (undated) DEVICE — PREP BETADINE KIT

## (undated) DEVICE — BLADE SCALPEL SAFETYLOCK #10

## (undated) DEVICE — TROCAR COVIDIEN VERSAPORT BLADELESS OPTICAL 5MM STANDARD

## (undated) DEVICE — SYR ASEPTO

## (undated) DEVICE — POSITIONER FOAM EGG CRATE ULNAR 2PCS (PINK)

## (undated) DEVICE — SUT MAXON 1 36" GS-24

## (undated) DEVICE — GLV 7.5 PROTEXIS (WHITE)